# Patient Record
Sex: FEMALE | Race: WHITE | NOT HISPANIC OR LATINO | Employment: UNEMPLOYED | URBAN - METROPOLITAN AREA
[De-identification: names, ages, dates, MRNs, and addresses within clinical notes are randomized per-mention and may not be internally consistent; named-entity substitution may affect disease eponyms.]

---

## 2023-12-17 DIAGNOSIS — M17.0 BILATERAL PRIMARY OSTEOARTHRITIS OF KNEE: ICD-10-CM

## 2023-12-17 RX ORDER — MELOXICAM 15 MG/1
15 TABLET ORAL DAILY
Qty: 30 TABLET | Refills: 3 | Status: ON HOLD | OUTPATIENT
Start: 2023-12-17 | End: 2024-05-09 | Stop reason: ALTCHOICE

## 2024-02-14 ENCOUNTER — HOSPITAL ENCOUNTER (OUTPATIENT)
Dept: RADIOLOGY | Facility: CLINIC | Age: 79
Discharge: HOME | End: 2024-02-14
Payer: MEDICARE

## 2024-02-14 ENCOUNTER — OFFICE VISIT (OUTPATIENT)
Dept: ORTHOPEDIC SURGERY | Facility: CLINIC | Age: 79
End: 2024-02-14
Payer: MEDICARE

## 2024-02-14 VITALS — HEIGHT: 62 IN | WEIGHT: 139 LBS | BODY MASS INDEX: 25.58 KG/M2

## 2024-02-14 DIAGNOSIS — M25.561 ACUTE BILATERAL KNEE PAIN: Primary | ICD-10-CM

## 2024-02-14 DIAGNOSIS — M25.562 ACUTE BILATERAL KNEE PAIN: Primary | ICD-10-CM

## 2024-02-14 DIAGNOSIS — M25.561 ACUTE BILATERAL KNEE PAIN: ICD-10-CM

## 2024-02-14 DIAGNOSIS — M25.562 ACUTE BILATERAL KNEE PAIN: ICD-10-CM

## 2024-02-14 PROCEDURE — 1159F MED LIST DOCD IN RCRD: CPT | Performed by: ORTHOPAEDIC SURGERY

## 2024-02-14 PROCEDURE — 99215 OFFICE O/P EST HI 40 MIN: CPT | Performed by: ORTHOPAEDIC SURGERY

## 2024-02-14 PROCEDURE — 1160F RVW MEDS BY RX/DR IN RCRD: CPT | Performed by: ORTHOPAEDIC SURGERY

## 2024-02-14 PROCEDURE — 1036F TOBACCO NON-USER: CPT | Performed by: ORTHOPAEDIC SURGERY

## 2024-02-14 PROCEDURE — 73564 X-RAY EXAM KNEE 4 OR MORE: CPT | Mod: 50

## 2024-02-14 RX ORDER — ACETAMINOPHEN 325 MG/1
975 TABLET ORAL ONCE
Status: CANCELLED | OUTPATIENT
Start: 2024-02-14 | End: 2024-02-14

## 2024-02-14 RX ORDER — SODIUM CHLORIDE 9 MG/ML
100 INJECTION, SOLUTION INTRAVENOUS CONTINUOUS
Status: CANCELLED | OUTPATIENT
Start: 2024-05-09

## 2024-02-14 RX ORDER — CELECOXIB 400 MG/1
400 CAPSULE ORAL ONCE
Status: CANCELLED | OUTPATIENT
Start: 2024-02-14 | End: 2024-02-14

## 2024-02-14 RX ORDER — CEFAZOLIN SODIUM 2 G/100ML
2 INJECTION, SOLUTION INTRAVENOUS EVERY 8 HOURS
Status: CANCELLED | OUTPATIENT
Start: 2024-05-09

## 2024-02-14 ASSESSMENT — PAIN - FUNCTIONAL ASSESSMENT: PAIN_FUNCTIONAL_ASSESSMENT: NO/DENIES PAIN

## 2024-02-14 NOTE — PROGRESS NOTES
This is a consultation from Dr. Jo Mota MD for   Chief Complaint   Patient presents with    Left Knee - Pain    Right Knee - Pain       This is a 78 y.o. female who presents for evaluation of bilateral knee pain right worse than left.  Patient states she has had knee pain for about 3 to 4 years, its gradually getting worse for the last few weeks she has had severe exacerbation of her right knee pain.  She complains of sharp pain over the anterior medial knee, makes it very difficult for her to walk.  She has had an extensive trial of nonsurgical management occluding use of anti-inflammatories physical therapy activity modification use of assistive devices cortisone injections gel injections.  Despite that she has severe and worsening pain which impacts her quality of life and activities of daily living.  She is never had surgery on either knee    Physical Exam    There has been no interval change in this patient's past medical, surgical, medications, allergies, family history or social history since the most recent visit to a provider within our department. 14 point review of systems was performed, reviewed, and negative except for pertinent positives documented in the history of present illness.     Constitutional: well developed, well nourished female in no acute distress  Psychiatric: normal mood, appropriate affect  Eyes: sclera anicteric  HENT: normocephalic/atraumatic  CV: regular rate and rhythm   Respiratory: non labored breathing  Integumentary: no rash  Neurological: moves all extremities    Right knee exam: skin intact no lacerations or abrations.  1+ effusion.  Tender medial joint line. negative log roll negative patellar grind. ROM 5-100 stable to varus and valgus stress at 0 and 30 degrees. negative lachman negative posterior drawer negative katerin. 5/5 ehl/fhl/gs/ta. silt s/s/sp/dp/t. 2+ dp/pt        Xrays were ordered by me, they were reviewed and independently interpreted by me today, they  show severe degenerative disease bone-on-bone arthritis subchondral sclerosis osteophyte formation Kellgren-Ta grade 4    Procedures      Impression/Plan: This is a 78 y.o. female with severe right knee arthritis that has failed nonoperative management.  Patient elected to proceed with right total knee.    I had an extensive discussion with the patient regarding her condition and possible treatment options. Nonsurgical treatment for right knee arthritis includes activity modification, weight loss, use of a cane or other assistive device, pain medications and nonsteroidal anti-inflammatory medications, joint injections, and physical therapy. The patient has attempted non-surgical treatment for this condition for greater than 6 months and it has failed. We discussed the risks benefits and alternatives of total knee arthroplasty. Benefits of joint replacement include: Relief of pain, improvement of function, and improved quality of life. Alternatives include observation and watchful waiting, and the nonsurgical modalities noted above.     Discussed with the patient that during total knee arthroplasty prosthetic resurfacing of the patella may or may not be performed at the discretion of thesurgeon during surgery. In the event that prosthetic patellar resurfacing is not performed, nonprosthetic arthroplasty will be performed with removal of bone spurs, circumferential synovectomy and electrocautery. Patient was informed that anterior knee pain and patellofemoral crepitus can potentially occur after knee surgery with or without prosthetic patellar resurfacing.    We discussed the risks of complications as well as the risks of morbidity and mortality related to surgical treatment with total joint replacement. We reviewed the fact that total joint replacement is major elective surgery with significant associated surgical and procedural risk factors as detailed below.   Risks include: Pain, blood loss, damage to nearby  "anatomical structures including but not limited to nerves or blood vessels muscles tendons and bone, failure surgery to ameliorate symptoms, persistence of pain surrounding the affected joint, mechanical failure of the prosthesis including but not limited to loosening of the prosthesis from bone ,breakage of the prosthesis and dislocation of the prosthesis, change in length or appearance of a limb, infection possibly necessitating further surgery, removal of the prosthesis, or limb amputation, the need for additional surgery for other reasons, blood clots, amputation, and death. No guarantees were implied or given.  All questions were answered and the patient voiced their understanding.     A complete set of surgical instructions was given to the patient. The patient was educated regarding preoperative nutrition, preparation of their home for postoperative rehabilitation, choosing a care partner, medical and dental clearance, the cessation of medications that can cause bleeding or presenting to risk for infection, chlorhexidine bath, nasal swab and decontamination, post operative follow up, and need for medical equipment. Patient was also educated regarding the possibility of same day surgery and related criteria and protocols. The patient will attend our joint class further education, and thereafter they will return for a preoperative visit. A presurgery education booklet was also given to the patient.     surgical plan: Right total knee  implants: DePuy  approach: Standard  DVT ppx aspirin  drugs to stop none  allergy to abx none  post operative abx: ancef +/- vanc (per protocol)  special clearance needed none    BMI Readings from Last 1 Encounters:   05/15/23 30.13 kg/m²      No results found for: \"CREATININE\"  Tobacco Use: Not on file      Computed MELD 3.0 unavailable. Necessary lab results were not found in the last year.  Computed MELD-Na unavailable. Necessary lab results were not found in the last year.     " "  No results found for: \"HGBA1C\"  No results found for: \"STAPHMRSASCR\"  "

## 2024-02-14 NOTE — LETTER
February 14, 2024     Jo Mota MD    Patient: Domenica Estrada   YOB: 1945   Date of Visit: 2/14/2024       Dear Dr. Jo Mota MD:    Thank you for referring Domenica Estrada to me for evaluation. Below are my notes for this consultation.  If you have questions, please do not hesitate to call me. I look forward to following your patient along with you.       Sincerely,     Sammy Wise MD      CC: No Recipients  ______________________________________________________________________________________    This is a consultation from Dr. Jo Mota MD for   Chief Complaint   Patient presents with   • Left Knee - Pain   • Right Knee - Pain       This is a 78 y.o. female who presents for evaluation of bilateral knee pain right worse than left.  Patient states she has had knee pain for about 3 to 4 years, its gradually getting worse for the last few weeks she has had severe exacerbation of her right knee pain.  She complains of sharp pain over the anterior medial knee, makes it very difficult for her to walk.  She has had an extensive trial of nonsurgical management occluding use of anti-inflammatories physical therapy activity modification use of assistive devices cortisone injections gel injections.  Despite that she has severe and worsening pain which impacts her quality of life and activities of daily living.  She is never had surgery on either knee    Physical Exam    There has been no interval change in this patient's past medical, surgical, medications, allergies, family history or social history since the most recent visit to a provider within our department. 14 point review of systems was performed, reviewed, and negative except for pertinent positives documented in the history of present illness.     Constitutional: well developed, well nourished female in no acute distress  Psychiatric: normal mood, appropriate affect  Eyes: sclera anicteric  HENT: normocephalic/atraumatic  CV:  regular rate and rhythm   Respiratory: non labored breathing  Integumentary: no rash  Neurological: moves all extremities    Right knee exam: skin intact no lacerations or abrations.  1+ effusion.  Tender medial joint line. negative log roll negative patellar grind. ROM 5-100 stable to varus and valgus stress at 0 and 30 degrees. negative lachman negative posterior drawer negative katerin. 5/5 ehl/fhl/gs/ta. silt s/s/sp/dp/t. 2+ dp/pt        Xrays were ordered by me, they were reviewed and independently interpreted by me today, they show severe degenerative disease bone-on-bone arthritis subchondral sclerosis osteophyte formation Kellgren-Ta grade 4    Procedures      Impression/Plan: This is a 78 y.o. female with severe right knee arthritis that has failed nonoperative management.  Patient elected to proceed with right total knee.    I had an extensive discussion with the patient regarding her condition and possible treatment options. Nonsurgical treatment for right knee arthritis includes activity modification, weight loss, use of a cane or other assistive device, pain medications and nonsteroidal anti-inflammatory medications, joint injections, and physical therapy. The patient has attempted non-surgical treatment for this condition for greater than 6 months and it has failed. We discussed the risks benefits and alternatives of total knee arthroplasty. Benefits of joint replacement include: Relief of pain, improvement of function, and improved quality of life. Alternatives include observation and watchful waiting, and the nonsurgical modalities noted above.     Discussed with the patient that during total knee arthroplasty prosthetic resurfacing of the patella may or may not be performed at the discretion of thesurgeon during surgery. In the event that prosthetic patellar resurfacing is not performed, nonprosthetic arthroplasty will be performed with removal of bone spurs, circumferential synovectomy and  electrocautery. Patient was informed that anterior knee pain and patellofemoral crepitus can potentially occur after knee surgery with or without prosthetic patellar resurfacing.    We discussed the risks of complications as well as the risks of morbidity and mortality related to surgical treatment with total joint replacement. We reviewed the fact that total joint replacement is major elective surgery with significant associated surgical and procedural risk factors as detailed below.   Risks include: Pain, blood loss, damage to nearby anatomical structures including but not limited to nerves or blood vessels muscles tendons and bone, failure surgery to ameliorate symptoms, persistence of pain surrounding the affected joint, mechanical failure of the prosthesis including but not limited to loosening of the prosthesis from bone ,breakage of the prosthesis and dislocation of the prosthesis, change in length or appearance of a limb, infection possibly necessitating further surgery, removal of the prosthesis, or limb amputation, the need for additional surgery for other reasons, blood clots, amputation, and death. No guarantees were implied or given.  All questions were answered and the patient voiced their understanding.     A complete set of surgical instructions was given to the patient. The patient was educated regarding preoperative nutrition, preparation of their home for postoperative rehabilitation, choosing a care partner, medical and dental clearance, the cessation of medications that can cause bleeding or presenting to risk for infection, chlorhexidine bath, nasal swab and decontamination, post operative follow up, and need for medical equipment. Patient was also educated regarding the possibility of same day surgery and related criteria and protocols. The patient will attend our joint class further education, and thereafter they will return for a preoperative visit. A presurgery education booklet was also  "given to the patient.     surgical plan: Right total knee  implants: DePuy  approach: Standard  DVT ppx aspirin  drugs to stop none  allergy to abx none  post operative abx: ancef +/- vanc (per protocol)  special clearance needed none    BMI Readings from Last 1 Encounters:   05/15/23 30.13 kg/m²      No results found for: \"CREATININE\"  Tobacco Use: Not on file      Computed MELD 3.0 unavailable. Necessary lab results were not found in the last year.  Computed MELD-Na unavailable. Necessary lab results were not found in the last year.       No results found for: \"HGBA1C\"  No results found for: \"STAPHMRSASCR\"  "

## 2024-02-28 ENCOUNTER — APPOINTMENT (OUTPATIENT)
Dept: ORTHOPEDIC SURGERY | Facility: CLINIC | Age: 79
End: 2024-02-28
Payer: MEDICARE

## 2024-03-04 ENCOUNTER — TELEPHONE (OUTPATIENT)
Dept: PRIMARY CARE | Facility: CLINIC | Age: 79
End: 2024-03-04
Payer: MEDICARE

## 2024-03-04 NOTE — TELEPHONE ENCOUNTER
Pt calls, she states for the last couple of weeks she has been having time swallowing, she gets a pain below the breasts, she has been taking meloxicam once a day for her knees, do you want her to be seen with you of refer to GI

## 2024-03-11 DIAGNOSIS — F41.9 ANXIETY: Primary | ICD-10-CM

## 2024-03-11 RX ORDER — HYDROXYZINE HYDROCHLORIDE 25 MG/1
TABLET, FILM COATED ORAL
Qty: 90 TABLET | Refills: 1 | Status: SHIPPED | OUTPATIENT
Start: 2024-03-11

## 2024-04-18 ENCOUNTER — EDUCATION (OUTPATIENT)
Dept: ORTHOPEDIC SURGERY | Facility: CLINIC | Age: 79
End: 2024-04-18
Payer: COMMERCIAL

## 2024-04-18 NOTE — GROUP NOTE
In addition to the group class activities, Domenica Estrada had the following lab work completed:  No orders of the defined types were placed in this encounter.      A new History and Physical was not completed.    This class lasted approximately 1 hour and had 5 participants. The nurse instructor covered the following topics:  Overview of joint replacement surgery.  Instructions for at-home preparation for surgery (included below).  Expectations before and after surgery including hospital stay.  Discharge planning and home care options.  Physical therapy overview and review of at-home exercises.    Information for Surgery or Hospital Stay  For morning surgery, do not eat or drink after midnight. This includes water, mints, and chewing gum.  For afternoon surgery, you may have a clear liquid breakfast before 6 A.M. Clear liquids are anything you can see through, like apple juice, cranberry juice, tea or black coffee without cream. Do not eat or drink after 6 A.M. This includes water.  Wear loose fitting clothes--something that can be slipped on and off easily over a dressing.  Bring a walker or crutches with you to the hospital on the day of surgery.  Please inform the office if you have any symptoms of illness or fever prior to surgery.  You need to have transportation home when discharged, as you will be medicated.  STOP any aspirin or anti-inflammatory products (Aleve, Advil, etc.) 5-7 days before surgery.  You may use Tylenol or Extra Strength Tylenol.  STOP any vitamins or herbal products 10 days before surgery.

## 2024-04-24 ENCOUNTER — PRE-ADMISSION TESTING (OUTPATIENT)
Dept: PREADMISSION TESTING | Facility: HOSPITAL | Age: 79
End: 2024-04-24
Payer: MEDICARE

## 2024-04-24 VITALS
DIASTOLIC BLOOD PRESSURE: 86 MMHG | OXYGEN SATURATION: 100 % | BODY MASS INDEX: 24.92 KG/M2 | HEIGHT: 61 IN | RESPIRATION RATE: 16 BRPM | WEIGHT: 132 LBS | SYSTOLIC BLOOD PRESSURE: 136 MMHG | TEMPERATURE: 97.7 F | HEART RATE: 98 BPM

## 2024-04-24 DIAGNOSIS — Z01.818 PREOPERATIVE TESTING: Primary | ICD-10-CM

## 2024-04-24 LAB
ANION GAP SERPL CALC-SCNC: 10 MMOL/L
BUN SERPL-MCNC: 23 MG/DL (ref 8–25)
CALCIUM SERPL-MCNC: 9.5 MG/DL (ref 8.5–10.4)
CHLORIDE SERPL-SCNC: 100 MMOL/L (ref 97–107)
CO2 SERPL-SCNC: 27 MMOL/L (ref 24–31)
CREAT SERPL-MCNC: 1.2 MG/DL (ref 0.4–1.6)
EGFRCR SERPLBLD CKD-EPI 2021: 46 ML/MIN/1.73M*2
ERYTHROCYTE [DISTWIDTH] IN BLOOD BY AUTOMATED COUNT: 13.6 % (ref 11.5–14.5)
GLUCOSE SERPL-MCNC: 93 MG/DL (ref 65–99)
HCT VFR BLD AUTO: 45.6 % (ref 36–46)
HGB BLD-MCNC: 14.6 G/DL (ref 12–16)
MCH RBC QN AUTO: 27.6 PG (ref 26–34)
MCHC RBC AUTO-ENTMCNC: 32 G/DL (ref 32–36)
MCV RBC AUTO: 86 FL (ref 80–100)
NRBC BLD-RTO: 0 /100 WBCS (ref 0–0)
PLATELET # BLD AUTO: 202 X10*3/UL (ref 150–450)
POTASSIUM SERPL-SCNC: 4.5 MMOL/L (ref 3.4–5.1)
RBC # BLD AUTO: 5.29 X10*6/UL (ref 4–5.2)
SODIUM SERPL-SCNC: 137 MMOL/L (ref 133–145)
WBC # BLD AUTO: 6.8 X10*3/UL (ref 4.4–11.3)

## 2024-04-24 PROCEDURE — 85027 COMPLETE CBC AUTOMATED: CPT | Performed by: NURSE PRACTITIONER

## 2024-04-24 PROCEDURE — 93005 ELECTROCARDIOGRAM TRACING: CPT

## 2024-04-24 PROCEDURE — 80048 BASIC METABOLIC PNL TOTAL CA: CPT | Performed by: NURSE PRACTITIONER

## 2024-04-24 PROCEDURE — 36415 COLL VENOUS BLD VENIPUNCTURE: CPT

## 2024-04-24 PROCEDURE — 87081 CULTURE SCREEN ONLY: CPT | Mod: TRILAB,WESLAB | Performed by: NURSE PRACTITIONER

## 2024-04-24 PROCEDURE — 99204 OFFICE O/P NEW MOD 45 MIN: CPT | Performed by: NURSE PRACTITIONER

## 2024-04-24 PROCEDURE — 93010 ELECTROCARDIOGRAM REPORT: CPT | Performed by: INTERNAL MEDICINE

## 2024-04-24 RX ORDER — CHLORHEXIDINE GLUCONATE ORAL RINSE 1.2 MG/ML
SOLUTION DENTAL
Qty: 473 ML | Refills: 0 | Status: SHIPPED | OUTPATIENT
Start: 2024-04-24 | End: 2024-05-10 | Stop reason: HOSPADM

## 2024-04-24 RX ORDER — IBUPROFEN 200 MG
400 TABLET ORAL EVERY 6 HOURS PRN
Status: ON HOLD | COMMUNITY
End: 2024-05-09 | Stop reason: ALTCHOICE

## 2024-04-24 ASSESSMENT — DUKE ACTIVITY SCORE INDEX (DASI)
CAN YOU CLIMB A FLIGHT OF STAIRS OR WALK UP A HILL: YES
CAN YOU HAVE SEXUAL RELATIONS: NO
CAN YOU PARTICIPATE IN STRENOUS SPORTS LIKE SWIMMING, SINGLES TENNIS, FOOTBALL, BASKETBALL, OR SKIING: NO
CAN YOU DO LIGHT WORK AROUND THE HOUSE LIKE DUSTING OR WASHING DISHES: YES
CAN YOU DO HEAVY WORK AROUND THE HOUSE LIKE SCRUBBING FLOORS OR LIFTING AND MOVING HEAVY FURNITURE: NO
CAN YOU WALK A BLOCK OR TWO ON LEVEL GROUND: NO
CAN YOU PARTICIPATE IN MODERATE RECREATIONAL ACTIVITIES LIKE GOLF, BOWLING, DANCING, DOUBLES TENNIS OR THROWING A BASEBALL OR FOOTBALL: NO
CAN YOU RUN A SHORT DISTANCE: NO
TOTAL_SCORE: 12.7
CAN YOU DO YARD WORK LIKE RAKING LEAVES, WEEDING OR PUSHING A MOWER: NO
CAN YOU WALK INDOORS, SUCH AS AROUND YOUR HOUSE: YES
CAN YOU TAKE CARE OF YOURSELF (EAT, DRESS, BATHE, OR USE TOILET): YES
DASI METS SCORE: 4.3
CAN YOU DO MODERATE WORK AROUND THE HOUSE LIKE VACUUMING, SWEEPING FLOORS OR CARRYING GROCERIES: NO

## 2024-04-24 ASSESSMENT — ENCOUNTER SYMPTOMS
EYES NEGATIVE: 1
CARDIOVASCULAR NEGATIVE: 1
RESPIRATORY NEGATIVE: 1
GASTROINTESTINAL NEGATIVE: 1
TREMORS: 1
PSYCHIATRIC NEGATIVE: 1
ARTHRALGIAS: 1
ACTIVITY CHANGE: 1

## 2024-04-24 NOTE — PREPROCEDURE INSTRUCTIONS
Why must I stop eating and drinking near surgery time?  With sedation, food or liquid in your stomach can enter your lungs causing serious complications  Increases nausea and vomiting    When do I need to stop eating and drinking before my surgery?   Do not eat or drink after midnight the night before your surgery/procedure.  You may have small sips of water to take your medication.    PAT DISCHARGE INSTRUCTIONS    Please call the Same Day Surgery (SDS) Department of the hospital where your procedure will be performed after 2:00 PM the day before your surgery. If you are scheduled on a Monday, or a Tuesday following a Monday holiday, you will need to call on the last business day prior to your surgery.    [place] [id number] [place] [id number] [phone]    Please let your surgeon know if:      You develop any open sores, shingles, burning or painful urination as these may increase your risk of an infection.   You no longer wish to have the surgery.   Any other personal circumstances change that may lead to the need to cancel or defer this surgery-such as being sick or getting admitted to any hospital within one week of your planned procedure.    Your contact details change, such as a change of address or phone number.    Starting now:     Please DO NOT drink alcohol or smoke for 24 hours before surgery. It is well known that quitting smoking can make a huge difference to your health and recovery from surgery. The longer you abstain from smoking, the better your chances of a healthy recovery. If you need help with quitting, call 8-093-QUIT-NOW to be connected to a trained counselor who will discuss the best methods to help you quit.     Before your surgery:    Please stop all supplements 7 days prior to surgery. Or as directed by your surgeon.   Please stop taking NSAID pain medicine such as Advil and Motrin 7 days before surgery.    If you develop any fever,  cough, cold, rashes, cuts, scratches, scrapes, urinary symptoms or infection anywhere on your body (including teeth and gums) prior to surgery, please call your surgeon’s office as soon as possible. This may require treatment to reduce the chance of cancellation on the day of surgery.    The day before your surgery:   DIET- Please follow the diet instructions at the top of your packet.   Get a good night’s rest.  Use the special soap for bathing if you have been instructed to use one.    Scheduled surgery times may change and you will be notified if this occurs - please check your personal voicemail for any updates.     On the morning of surgery:   Wear comfortable, loose fitting clothes which open in the front. Please do not wear moisturizers, creams, lotions, makeup or perfume.    Please bring with you to surgery:   Photo ID and insurance card   Current list of medicines and allergies   Pacemaker/ Defibrillator/Heart stent cards   CPAP machine and mask    Slings/ splints/ crutches   A copy of your complete advanced directive/DHPOA.    Please do NOT bring with you to surgery:   All jewelry and valuables should be left at home.   Prosthetic devices such as contact lenses, hearing aids, dentures, eyelash extensions, hairpins and body piercings must be removed prior to going in to the surgical suite.    After outpatient surgery:   A responsible adult MUST accompany you at the time of discharge and stay with you for 24 hours after your surgery. You may NOT drive yourself home after surgery.    Do not drive, operate machinery, make critical decisions or do activities that require co-ordination or balance until after a night’s sleep.   Do not drink alcoholic beverages for 24 hours.   Instructions for resuming your medications will be provided by your surgeon.    CALL YOUR DOCTOR AFTER SURGERY IF YOU HAVE:     Chills and/or a fever of 101° F or higher.    Redness, swelling, pus or drainage from your surgical wound or a  bad smell from the wound.    Lightheadedness, fainting or confusion.    Persistent vomiting (throwing up) and are not able to eat or drink for 12 hours.    Three or more loose, watery bowel movements in 24 hours (diarrhea).   Difficulty or pain while urinating( after non-urological surgery)    Pain and swelling in your legs, especially if it is only on one side.    Difficulty breathing or are breathing faster than normal.    Any new concerning symptoms.        Patient Information: Pre-Operative Infection Prevention Measures     Why did I have my nose, under my arms, and groin swabbed?  The purpose of the swab is to identify Staphylococcus aureus inside your nose or on your skin.  The swab was sent to the laboratory for culture.  A positive swab/culture for Staphylococcus aureus is called colonization or carriage.      What is Staphylococcus aureus?  Staphylococcus aureus, also known as “staph”, is a germ found on the skin or in the nose of healthy people.  Sometimes Staphylococcus aureus can get into the body and cause an infection.  This can be minor (such as pimples, boils, or other skin problems).  It might also be serious (such as a blood infection, pneumonia, or a surgical site infection).    What is Staphylococcus aureus colonization or carriage?  Colonization or carriage means that a person has the germ but is not sick from it.  These bacteria can be spread on the hands or when breathing or sneezing.    How is Staphylococcus aureus spread?  It is most often spread by close contact with a person or item that carries it.    What happens if my culture is positive for Staphylococcus aureus?  Your doctor/medical team will use this information to guide any antibiotic treatment which may be necessary.  Regardless of the culture results, we will clean the inside of your nose with a betadine swab just before you have your surgery.      Will I get an infection if I have Staphylococcus aureus in my nose or on my  skin?  Anyone can get an infection with Staphylococcus aureus.  However, the best way to reduce your risk of infection is to follow the instructions provided to you for the use of your CHG soap and dental rinse.        Patient Information: Oral/Dental Rinse    What is oral/dental rinse?   It is a mouthwash. It is a way of cleaning the mouth with a germ-killing solution before your surgery.  The solution contains chlorhexidine, commonly known as CHG.   It is used inside the mouth to kill a bacteria known as Staphylococcus aureus.  Let your doctor know if you are allergic to Chlorhexidine.    Why do I need to use CHG oral/dental rinse?  The CHG oral/dental rinse helps to kill a bacteria in your mouth known as Staphylococcus aureus.     This reduces the risk of infection at the surgical site.      Using your CHG oral/dental rinse  STEPS:  Use your CHG oral/dental rinse after you brush your teeth the night before (at bedtime) and the morning of your surgery.  Follow all directions on your prescription label.    Use the cap on the container to measure 15ml   Swish (gargle if you can) the mouthwash in your mouth for at least 30 seconds, (do not swallow) and spit out  After you use your CHG rinse, do not rinse your mouth with water, drink or eat.  Please refer to the prescription label for the appropriate time to resume oral intake      What side effects might I have using the CHG oral/dental rinse?  CHG rinse will stick to plaque on the teeth.  Brush and floss just before use.  Teeth brushing will help avoid staining of plaque during use.      Patient Information: Home Preoperative Antibacterial Shower      What is a home preoperative antibacterial shower?  This shower is a way of cleaning the skin with a germ-killing solution before surgery.  The solution contains chlorhexidine, commonly known as CHG.  CHG is a skin cleanser with germ-killing ability.  Let your doctor know if you are allergic to chlorhexidine.    Why do  I need to take a preoperative antibacterial shower?  Skin is not sterile.  It is best to try to make your skin as free of germs as possible before surgery.  Proper cleansing with a germ-killing soap before surgery can lower the number of germs on your skin.  This helps to reduce the risk of infection at the surgical site.  Following the instructions listed below will help you prepare your skin for surgery.      How do I use the solution?  Steps:  Begin using your CHG soap 5 days before your scheduled surgery on ________________________.    First, wash and rinse your hair using the CHG soap. Keep CHG soap away from ear canals and eyes.  Rinse completely, do not condition.  Hair extensions should be removed.  Wash your face with your normal soap and rinse.    Apply the CHG solution to a clean wet washcloth.  Turn the water off or move away from the water spray to avoid premature rinsing of the CHG soap as you are applying.   Firmly lather your entire body from the neck down.  Do not use on your face.  Pay special attention to the area(s) where your incision(s) will be located unless they are on your face.  Avoid scrubbing your skin too hard.  The important point is to have the CHG soap sit on your skin for 3 minutes.    When the 3 minutes are up, turn on the water and rinse the CHG solution off your body completely.   DO NOT wash with regular soap after you have used the CHG soap solution  Pat yourself dry with a clean, freshly-laundered towel.  DO NOT apply powders, deodorants, or lotions.  Dress in clean, freshly laundered nightclothes.    Be sure to sleep with clean, freshly laundered sheets.  Be aware that CHG will cause stains on fabrics; if you wash them with bleach after use.  Rinse your washcloth and other linens that have contact with CHG completely.  Use only non-chlorine detergents to launder the items used.   The morning of surgery is the fifth day.  Repeat the above steps and dress in clean comfortable  clothing     Whom should I contact if I have any questions regarding the use of CHG soap?  Call the [facility] at [phone] if you have any questions.               Preoperative Brain Exercises    What are brain exercises?  A brain exercise is any activity that engages your thinking (cognitive) skills.    What types of activities are considered brain exercises?  Jigsaw puzzles, crossword puzzles, word jumble, memory games, word search, and many more.  Many can be found free online or on your phone via a mobile krish.    Why should I do brain exercises before my surgery?  More recent research has shown brain exercise before surgery can lower the risk of postoperative delirium (confusion) which can be especially important for older adults.  Patients who did brain exercises for 5 to 10 hours the days before surgery, cut their risk of postoperative delirium in half up to 1 week after surgery.     Medication List            Accurate as of April 24, 2024 10:39 AM. Always use your most recent med list.                hydrOXYzine HCL 25 mg tablet  Commonly known as: Atarax  1 TABLET AS NEEDED DAILY FOR ANXIETY  Notes to patient: Hold dose day of surgery.     ibuprofen 200 mg tablet  Medication Adjustments for Surgery: Stop 7 days before surgery     MAG GLYCINATE ORAL  Medication Adjustments for Surgery: Stop 7 days before surgery     meloxicam 15 mg tablet  Commonly known as: Mobic  TAKE 1 TABLET BY MOUTH EVERY DAY WITH FOOD  Notes to patient: NOT TAKING     NON FORMULARY  Medication Adjustments for Surgery: Stop 7 days before surgery  Notes to patient: STOP ALL SUPPLEMENTS ONE WEEK BEFORE

## 2024-04-24 NOTE — CPM/PAT H&P
CPM/PAT Evaluation       Name: Domenica Estrada (Domenica Estrada)  /Age: 1945/78 y.o.     In-Person       Chief Complaint: Bilateral knee pain     HPI  An 78-year-old female with bilateral knee pain. History of progressive bilateral knee pain, R knee for years and L knee more recently. She has had progressive right knee pain that has become more severe in the past 5 years. Symptoms increase with prolonged standing, ambulation, stairs, or transitioning from sitting to standing interfering with ADLs and quality of life.  Conservative treatments/injections not helping.  Denies fever, chills, chest pain, shortness of breath, syncope, or right lower extremity numbness.  She is scheduled for right total knee replacement, unilateral cement.    Past Medical History:   Diagnosis Date    Anxiety     GERD (gastroesophageal reflux disease)     HLD (hyperlipidemia)     Osteoarthritis     Osteopenia        Past Surgical History:   Procedure Laterality Date    APPENDECTOMY      CHOLECYSTECTOMY           Allergies   Allergen Reactions    Mobic [Meloxicam] GI Upset     GASTRITIS       Current Outpatient Medications   Medication Sig Dispense Refill    chlorhexidine (Peridex) 0.12 % solution Use cap to measure 15 mL.  Swish/gargle mouthwash for at least 30 seconds.  Do not swallow.  Use night before surgery after brushing teeth and morning of surgery after brushing teeth. 473 mL 0    hydrOXYzine HCL (Atarax) 25 mg tablet 1 TABLET AS NEEDED DAILY FOR ANXIETY 90 tablet 1    ibuprofen 200 mg tablet Take 2 tablets (400 mg) by mouth every 6 hours if needed for mild pain (1 - 3).      magnesium glycinate (MAG GLYCINATE ORAL) Take 1 tablet by mouth once daily.      meloxicam (Mobic) 15 mg tablet TAKE 1 TABLET BY MOUTH EVERY DAY WITH FOOD (Patient not taking: Reported on 2024) 30 tablet 3    NON FORMULARY Take 1 each by mouth once daily. HEMP OIL COMPLEX DAILY  OKRA PEPSIN E3 DAILY  PROSYNBIOTIC DAILY  ZYMEX DAILY       No  "current facility-administered medications for this visit.       Review of Systems   Constitutional:  Positive for activity change.   HENT: Negative.     Eyes: Negative.         Glasses   Respiratory: Negative.     Cardiovascular: Negative.    Gastrointestinal: Negative.    Musculoskeletal:  Positive for arthralgias and gait problem.        Bilateral knee pain, uses a cane   Skin: Negative.    Neurological:  Positive for tremors.   Psychiatric/Behavioral: Negative.          Physical Exam  Vitals reviewed.   HENT:      Head: Normocephalic.      Mouth/Throat:      Mouth: Mucous membranes are moist.   Eyes:      Pupils: Pupils are equal, round, and reactive to light.   Cardiovascular:      Rate and Rhythm: Normal rate and regular rhythm.   Pulmonary:      Effort: Pulmonary effort is normal.      Breath sounds: Normal breath sounds.   Abdominal:      Palpations: Abdomen is soft.   Musculoskeletal:      Cervical back: Normal range of motion.      Right lower leg: Edema present.      Left lower leg: Edema present.   Skin:     General: Skin is warm and dry.   Neurological:      Mental Status: She is alert and oriented to person, place, and time.      Comments: Head tremor present   Psychiatric:         Mood and Affect: Mood normal.          PAT AIRWAY:   Airway:     Mallampati::  II    Neck ROM::  Full  normal        /86   Pulse 98   Temp 36.5 °C (97.7 °F) (Temporal)   Resp 16   Ht 1.549 m (5' 1\")   Wt 59.9 kg (132 lb)   SpO2 100%   BMI 24.94 kg/m²        Stop Bang Score 1     CHADS 2 score: 2.8%  DASI score: 12.7  METS score: 4.3  Revised cardiac risk index: 0.9%  ASA II  ARISCAT 1.6%  PAT order CBC, BMP, UA, MRSA, EKG  EKG at PAT Normal sinus rhythm, anterior infarct age undetermined limb and area awaiting confirmation by cardiologist  Assessment and Plan:     Acute bilateral knee pain Plan: Total knee replacement, unilateral cement  Anxiety  Osteopenia  Osteoarthritis  H/o HLD-no longer uses " medication  BMI-24.94

## 2024-04-25 ENCOUNTER — APPOINTMENT (OUTPATIENT)
Dept: PREADMISSION TESTING | Facility: HOSPITAL | Age: 79
End: 2024-04-25
Payer: MEDICARE

## 2024-04-25 LAB
ATRIAL RATE: 75 BPM
P AXIS: 24 DEGREES
P OFFSET: 188 MS
P ONSET: 147 MS
PR INTERVAL: 152 MS
Q ONSET: 223 MS
QRS COUNT: 12 BEATS
QRS DURATION: 82 MS
QT INTERVAL: 382 MS
QTC CALCULATION(BAZETT): 426 MS
QTC FREDERICIA: 411 MS
R AXIS: 41 DEGREES
T AXIS: 43 DEGREES
T OFFSET: 414 MS
VENTRICULAR RATE: 75 BPM

## 2024-04-26 LAB — STAPHYLOCOCCUS SPEC CULT: NORMAL

## 2024-05-02 ENCOUNTER — HOSPITAL ENCOUNTER (OUTPATIENT)
Dept: RADIOLOGY | Facility: HOSPITAL | Age: 79
Discharge: HOME | End: 2024-05-02
Payer: MEDICARE

## 2024-05-02 ENCOUNTER — OFFICE VISIT (OUTPATIENT)
Dept: ORTHOPEDIC SURGERY | Facility: CLINIC | Age: 79
End: 2024-05-02
Payer: MEDICARE

## 2024-05-02 DIAGNOSIS — M25.562 ACUTE BILATERAL KNEE PAIN: ICD-10-CM

## 2024-05-02 DIAGNOSIS — M25.561 ACUTE BILATERAL KNEE PAIN: Primary | ICD-10-CM

## 2024-05-02 DIAGNOSIS — M25.561 ACUTE BILATERAL KNEE PAIN: ICD-10-CM

## 2024-05-02 DIAGNOSIS — M25.562 ACUTE BILATERAL KNEE PAIN: Primary | ICD-10-CM

## 2024-05-02 LAB
APPEARANCE UR: ABNORMAL
BACTERIA #/AREA URNS AUTO: ABNORMAL /HPF
BILIRUB UR STRIP.AUTO-MCNC: NEGATIVE MG/DL
CAOX CRY #/AREA UR COMP ASSIST: ABNORMAL /HPF
COLOR UR: YELLOW
GLUCOSE UR STRIP.AUTO-MCNC: NORMAL MG/DL
HYALINE CASTS #/AREA URNS AUTO: ABNORMAL /LPF
KETONES UR STRIP.AUTO-MCNC: NEGATIVE MG/DL
LEUKOCYTE ESTERASE UR QL STRIP.AUTO: ABNORMAL
MUCOUS THREADS #/AREA URNS AUTO: ABNORMAL /LPF
NITRITE UR QL STRIP.AUTO: ABNORMAL
PH UR STRIP.AUTO: 6 [PH]
PROT UR STRIP.AUTO-MCNC: ABNORMAL MG/DL
RBC # UR STRIP.AUTO: NEGATIVE /UL
RBC #/AREA URNS AUTO: ABNORMAL /HPF
SP GR UR STRIP.AUTO: 1.02
SQUAMOUS #/AREA URNS AUTO: ABNORMAL /HPF
UROBILINOGEN UR STRIP.AUTO-MCNC: ABNORMAL MG/DL
WBC #/AREA URNS AUTO: >50 /HPF

## 2024-05-02 PROCEDURE — 77073 BONE LENGTH STUDIES: CPT

## 2024-05-02 PROCEDURE — 99213 OFFICE O/P EST LOW 20 MIN: CPT | Performed by: ORTHOPAEDIC SURGERY

## 2024-05-02 PROCEDURE — 1036F TOBACCO NON-USER: CPT | Performed by: ORTHOPAEDIC SURGERY

## 2024-05-02 PROCEDURE — 77073 BONE LENGTH STUDIES: CPT | Performed by: RADIOLOGY

## 2024-05-02 PROCEDURE — 1159F MED LIST DOCD IN RCRD: CPT | Performed by: ORTHOPAEDIC SURGERY

## 2024-05-02 PROCEDURE — 1160F RVW MEDS BY RX/DR IN RCRD: CPT | Performed by: ORTHOPAEDIC SURGERY

## 2024-05-02 PROCEDURE — 87086 URINE CULTURE/COLONY COUNT: CPT | Mod: TRILAB,WESLAB | Performed by: NURSE PRACTITIONER

## 2024-05-02 PROCEDURE — 81001 URINALYSIS AUTO W/SCOPE: CPT | Performed by: NURSE PRACTITIONER

## 2024-05-02 NOTE — PROGRESS NOTES
Chief Complaint   Patient presents with    Right Knee - Follow-up     PRE OP RT TKA          This is a 78 y.o. year old female who presents for preoperative visit for her left knee.  At her previous visit, patient's right knee has been hurting worse than left and she decided on right total knee initially.  She does have severe bilateral knee arthritis.  In the last couple of months she has been needing her left knee has been hurting much more than the right.  She would like to switch into the left knee.  Patient has severe degenerative disease of the affected joint. The patient complains of severe pain in the area, the pain is gradually getting worse. She has failed extensive nonsurgical treatment including anti-inflammatories physical therapy use of assistive devices activity modification cortisone injections. Despite this interventions the patient is worsening pain which impacts her quality of life and activities of daily living and they would like to proceed with joint replacement.    Physical Exam    There has been no interval change in this patient's past medical, surgical, medications, allergies, family history or social history since the most recent visit to a provider within our department. 14 point review of systems was performed, reviewed, and negative except for pertinent positives documented in the history of present illness.     Constitutional: well developed, well nourished female in no acute distress  Psychiatric: normal mood, appropriate affect  Eyes: sclera anicteric  HENT: normocephalic/atraumatic  CV: regular rate and rhythm   Respiratory: non labored breathing  Integumentary: no rash  Neurological: moves all extremities    Pre-Admission Testing on 04/24/2024   Component Date Value Ref Range Status    Staph/MRSA Screen Culture 04/24/2024 No Staphylococcus aureus isolated   Final    WBC 04/24/2024 6.8  4.4 - 11.3 x10*3/uL Final    nRBC 04/24/2024 0.0  0.0 - 0.0 /100 WBCs Final    RBC 04/24/2024  5.29 (H)  4.00 - 5.20 x10*6/uL Final    Hemoglobin 04/24/2024 14.6  12.0 - 16.0 g/dL Final    Hematocrit 04/24/2024 45.6  36.0 - 46.0 % Final    MCV 04/24/2024 86  80 - 100 fL Final    MCH 04/24/2024 27.6  26.0 - 34.0 pg Final    MCHC 04/24/2024 32.0  32.0 - 36.0 g/dL Final    RDW 04/24/2024 13.6  11.5 - 14.5 % Final    Platelets 04/24/2024 202  150 - 450 x10*3/uL Final    Glucose 04/24/2024 93  65 - 99 mg/dL Final    Sodium 04/24/2024 137  133 - 145 mmol/L Final    Potassium 04/24/2024 4.5  3.4 - 5.1 mmol/L Final    Chloride 04/24/2024 100  97 - 107 mmol/L Final    Bicarbonate 04/24/2024 27  24 - 31 mmol/L Final    Urea Nitrogen 04/24/2024 23  8 - 25 mg/dL Final    Creatinine 04/24/2024 1.20  0.40 - 1.60 mg/dL Final    eGFR 04/24/2024 46 (L)  >60 mL/min/1.73m*2 Final    Calculations of estimated GFR are performed using the 2021 CKD-EPI Study Refit equation without the race variable for the IDMS-Traceable creatinine methods.  https://jasn.asnjournals.org/content/early/2021/09/22/ASN.8703322914    Calcium 04/24/2024 9.5  8.5 - 10.4 mg/dL Final    Anion Gap 04/24/2024 10  <=19 mmol/L Final    Ventricular Rate 04/24/2024 75  BPM Final    Atrial Rate 04/24/2024 75  BPM Final    OR Interval 04/24/2024 152  ms Final    QRS Duration 04/24/2024 82  ms Final    QT Interval 04/24/2024 382  ms Final    QTC Calculation(Bazett) 04/24/2024 426  ms Final    P Axis 04/24/2024 24  degrees Final    R Axis 04/24/2024 41  degrees Final    T Axis 04/24/2024 43  degrees Final    QRS Count 04/24/2024 12  beats Final    Q Onset 04/24/2024 223  ms Final    P Onset 04/24/2024 147  ms Final    P Offset 04/24/2024 188  ms Final    T Offset 04/24/2024 414  ms Final    QTC Fredericia 04/24/2024 411  ms Final         Left knee exam: skin intact no lacerations or abrations.  1+ effusion.  Tender medial joint line. negative log roll negative patellar grind. ROM 5-100, slight varus deformity. stable to varus and valgus stress at 0 and 30 degrees.  "negative lachman negative posterior drawer negative katerin. 5/5 ehl/fhl/gs/ta. silt s/s/sp/dp/t. 2+ dp/pt    X-rays left knee were reviewed and interpreted by me, show severe degenerative disease bone-on-bone arthritis subchondral sclerosis osteophyte formation Kellgren-Ta grade 4    Preoperative labs reviewed, no findings which would preclude surgery    Impression plan: This is a 78 y.o. yo femalewith severe end-stage degenerative disease of the left knee that has failed nonoperative management.  Once again I discussed with the patient in detail the risks benefits and alternatives of total joint replacement. For the full details of that discussion see my previous note.  The complete discussion in that note regarding the right knee, this all applies to the left which has similar severe arthritis.  The patient has obtained appropriate medical and dental clearance, and her labs have been reviewed. We will plan to proceed with surgery.    BMI Readings from Last 1 Encounters:   04/24/24 24.94 kg/m²     Lab Results   Component Value Date    CREATININE 1.20 04/24/2024     Tobacco Use: Low Risk  (2/14/2024)    Patient History     Smoking Tobacco Use: Never     Smokeless Tobacco Use: Never     Passive Exposure: Not on file      Computed MELD 3.0 unavailable. One or more values for this score either were not found within the given timeframe or did not fit some other criterion.  Computed MELD-Na unavailable. One or more values for this score either were not found within the given timeframe or did not fit some other criterion.       No results found for: \"HGBA1C\"  Lab Results   Component Value Date    STAPHMRSASCR No Staphylococcus aureus isolated 04/24/2024     "

## 2024-05-05 LAB — BACTERIA UR CULT: ABNORMAL

## 2024-05-06 DIAGNOSIS — Z96.652 S/P TOTAL KNEE ARTHROPLASTY, LEFT: Primary | ICD-10-CM

## 2024-05-06 DIAGNOSIS — N39.0 URINARY TRACT INFECTION WITHOUT HEMATURIA, SITE UNSPECIFIED: ICD-10-CM

## 2024-05-06 RX ORDER — CIPROFLOXACIN 500 MG/1
500 TABLET ORAL 2 TIMES DAILY
Qty: 10 TABLET | Refills: 0 | Status: SHIPPED | OUTPATIENT
Start: 2024-05-06 | End: 2024-05-11

## 2024-05-09 ENCOUNTER — APPOINTMENT (OUTPATIENT)
Dept: RADIOLOGY | Facility: HOSPITAL | Age: 79
End: 2024-05-09
Payer: MEDICARE

## 2024-05-09 ENCOUNTER — ANESTHESIA EVENT (OUTPATIENT)
Dept: OPERATING ROOM | Facility: HOSPITAL | Age: 79
End: 2024-05-09
Payer: MEDICARE

## 2024-05-09 ENCOUNTER — HOSPITAL ENCOUNTER (OUTPATIENT)
Facility: HOSPITAL | Age: 79
Discharge: HOME | End: 2024-05-10
Attending: ORTHOPAEDIC SURGERY | Admitting: ORTHOPAEDIC SURGERY
Payer: MEDICARE

## 2024-05-09 ENCOUNTER — HOME HEALTH ADMISSION (OUTPATIENT)
Dept: HOME HEALTH SERVICES | Facility: HOME HEALTH | Age: 79
End: 2024-05-09
Payer: MEDICARE

## 2024-05-09 ENCOUNTER — ANESTHESIA (OUTPATIENT)
Dept: OPERATING ROOM | Facility: HOSPITAL | Age: 79
End: 2024-05-09
Payer: MEDICARE

## 2024-05-09 DIAGNOSIS — M25.561 ACUTE BILATERAL KNEE PAIN: Primary | ICD-10-CM

## 2024-05-09 DIAGNOSIS — M17.12 ARTHRITIS OF LEFT KNEE: ICD-10-CM

## 2024-05-09 DIAGNOSIS — M25.562 ACUTE BILATERAL KNEE PAIN: Primary | ICD-10-CM

## 2024-05-09 PROBLEM — Z96.652 S/P TOTAL KNEE ARTHROPLASTY, LEFT: Status: ACTIVE | Noted: 2024-05-09

## 2024-05-09 PROCEDURE — 27447 TOTAL KNEE ARTHROPLASTY: CPT

## 2024-05-09 PROCEDURE — 2500000001 HC RX 250 WO HCPCS SELF ADMINISTERED DRUGS (ALT 637 FOR MEDICARE OP)

## 2024-05-09 PROCEDURE — A6213 FOAM DRG >16<=48 SQ IN W/BDR: HCPCS | Performed by: ORTHOPAEDIC SURGERY

## 2024-05-09 PROCEDURE — 7100000001 HC RECOVERY ROOM TIME - INITIAL BASE CHARGE: Performed by: ORTHOPAEDIC SURGERY

## 2024-05-09 PROCEDURE — 3700000001 HC GENERAL ANESTHESIA TIME - INITIAL BASE CHARGE: Performed by: ORTHOPAEDIC SURGERY

## 2024-05-09 PROCEDURE — 2780000003 HC OR 278 NO HCPCS: Performed by: ORTHOPAEDIC SURGERY

## 2024-05-09 PROCEDURE — 0753T DGTZ GLS MCRSCP SLD LEVEL IV: CPT | Mod: TRILAB,WESLAB | Performed by: ORTHOPAEDIC SURGERY

## 2024-05-09 PROCEDURE — 3600000005 HC OR TIME - INITIAL BASE CHARGE - PROCEDURE LEVEL FIVE: Performed by: ORTHOPAEDIC SURGERY

## 2024-05-09 PROCEDURE — 2720000007 HC OR 272 NO HCPCS: Performed by: ORTHOPAEDIC SURGERY

## 2024-05-09 PROCEDURE — 73560 X-RAY EXAM OF KNEE 1 OR 2: CPT | Mod: LT

## 2024-05-09 PROCEDURE — 7100000002 HC RECOVERY ROOM TIME - EACH INCREMENTAL 1 MINUTE: Performed by: ORTHOPAEDIC SURGERY

## 2024-05-09 PROCEDURE — A27447 PR TOTAL KNEE ARTHROPLASTY: Performed by: ANESTHESIOLOGY

## 2024-05-09 PROCEDURE — 2500000004 HC RX 250 GENERAL PHARMACY W/ HCPCS (ALT 636 FOR OP/ED): Performed by: ANESTHESIOLOGIST ASSISTANT

## 2024-05-09 PROCEDURE — C1713 ANCHOR/SCREW BN/BN,TIS/BN: HCPCS | Performed by: ORTHOPAEDIC SURGERY

## 2024-05-09 PROCEDURE — 2500000004 HC RX 250 GENERAL PHARMACY W/ HCPCS (ALT 636 FOR OP/ED)

## 2024-05-09 PROCEDURE — 27447 TOTAL KNEE ARTHROPLASTY: CPT | Performed by: ORTHOPAEDIC SURGERY

## 2024-05-09 PROCEDURE — C1776 JOINT DEVICE (IMPLANTABLE): HCPCS | Performed by: ORTHOPAEDIC SURGERY

## 2024-05-09 PROCEDURE — A4649 SURGICAL SUPPLIES: HCPCS | Performed by: ORTHOPAEDIC SURGERY

## 2024-05-09 PROCEDURE — 2500000005 HC RX 250 GENERAL PHARMACY W/O HCPCS: Performed by: ANESTHESIOLOGIST ASSISTANT

## 2024-05-09 PROCEDURE — 7100000011 HC EXTENDED STAY RECOVERY HOURLY - NURSING UNIT

## 2024-05-09 PROCEDURE — A27447 PR TOTAL KNEE ARTHROPLASTY: Performed by: ANESTHESIOLOGIST ASSISTANT

## 2024-05-09 PROCEDURE — A4216 STERILE WATER/SALINE, 10 ML: HCPCS | Performed by: ORTHOPAEDIC SURGERY

## 2024-05-09 PROCEDURE — 2500000004 HC RX 250 GENERAL PHARMACY W/ HCPCS (ALT 636 FOR OP/ED): Performed by: ANESTHESIOLOGY

## 2024-05-09 PROCEDURE — 64447 NJX AA&/STRD FEMORAL NRV IMG: CPT | Performed by: ANESTHESIOLOGY

## 2024-05-09 PROCEDURE — G0378 HOSPITAL OBSERVATION PER HR: HCPCS

## 2024-05-09 PROCEDURE — 3600000010 HC OR TIME - EACH INCREMENTAL 1 MINUTE - PROCEDURE LEVEL FIVE: Performed by: ORTHOPAEDIC SURGERY

## 2024-05-09 PROCEDURE — 88305 TISSUE EXAM BY PATHOLOGIST: CPT | Performed by: PATHOLOGY

## 2024-05-09 PROCEDURE — 2500000004 HC RX 250 GENERAL PHARMACY W/ HCPCS (ALT 636 FOR OP/ED): Mod: JZ | Performed by: ORTHOPAEDIC SURGERY

## 2024-05-09 PROCEDURE — A9999 DME SUPPLY OR ACCESSORY, NOS: HCPCS | Performed by: ORTHOPAEDIC SURGERY

## 2024-05-09 PROCEDURE — 73560 X-RAY EXAM OF KNEE 1 OR 2: CPT | Mod: LEFT SIDE | Performed by: RADIOLOGY

## 2024-05-09 PROCEDURE — 3700000002 HC GENERAL ANESTHESIA TIME - EACH INCREMENTAL 1 MINUTE: Performed by: ORTHOPAEDIC SURGERY

## 2024-05-09 PROCEDURE — RXMED WILLOW AMBULATORY MEDICATION CHARGE

## 2024-05-09 PROCEDURE — 99100 ANES PT EXTEME AGE<1 YR&>70: CPT | Performed by: ANESTHESIOLOGY

## 2024-05-09 PROCEDURE — 2500000004 HC RX 250 GENERAL PHARMACY W/ HCPCS (ALT 636 FOR OP/ED): Performed by: ORTHOPAEDIC SURGERY

## 2024-05-09 PROCEDURE — 88311 DECALCIFY TISSUE: CPT | Performed by: PATHOLOGY

## 2024-05-09 DEVICE — ATTUNE PATELLA MEDIALIZED DOME 35MM CEMENTED AOX
Type: IMPLANTABLE DEVICE | Site: KNEE | Status: FUNCTIONAL
Brand: ATTUNE

## 2024-05-09 DEVICE — ATTUNE KNEE SYSTEM FEMORAL POSTERIOR STABILIZED SIZE 4 LEFT CEMENTED
Type: IMPLANTABLE DEVICE | Site: KNEE | Status: FUNCTIONAL
Brand: ATTUNE

## 2024-05-09 DEVICE — FULL DOSE BONE CEMENT, 10 PACK CATALOG NUMBER IS 6191-1-010
Type: IMPLANTABLE DEVICE | Site: KNEE | Status: FUNCTIONAL
Brand: SIMPLEX

## 2024-05-09 DEVICE — ATTUNE KNEE SYSTEM TIBIAL INSERT ROTATING PLATFORM POSTERIOR STABILIZED 4 6MM AOX
Type: IMPLANTABLE DEVICE | Site: KNEE | Status: FUNCTIONAL
Brand: ATTUNE

## 2024-05-09 DEVICE — ATTUNE KNEE SYSTEM TIBIAL BASE ROTATING PLATFORM SIZE 4 CEMENTED
Type: IMPLANTABLE DEVICE | Site: KNEE | Status: FUNCTIONAL
Brand: ATTUNE

## 2024-05-09 RX ORDER — FENTANYL CITRATE 50 UG/ML
50 INJECTION, SOLUTION INTRAMUSCULAR; INTRAVENOUS EVERY 5 MIN PRN
Status: DISCONTINUED | OUTPATIENT
Start: 2024-05-09 | End: 2024-05-09 | Stop reason: HOSPADM

## 2024-05-09 RX ORDER — PROPOFOL 10 MG/ML
INJECTION, EMULSION INTRAVENOUS CONTINUOUS PRN
Status: DISCONTINUED | OUTPATIENT
Start: 2024-05-09 | End: 2024-05-09

## 2024-05-09 RX ORDER — SODIUM CHLORIDE, SODIUM LACTATE, POTASSIUM CHLORIDE, CALCIUM CHLORIDE 600; 310; 30; 20 MG/100ML; MG/100ML; MG/100ML; MG/100ML
50 INJECTION, SOLUTION INTRAVENOUS CONTINUOUS
Status: DISCONTINUED | OUTPATIENT
Start: 2024-05-09 | End: 2024-05-10 | Stop reason: HOSPADM

## 2024-05-09 RX ORDER — ASPIRIN 81 MG/1
81 TABLET ORAL 2 TIMES DAILY
Status: DISCONTINUED | OUTPATIENT
Start: 2024-05-09 | End: 2024-05-10 | Stop reason: HOSPADM

## 2024-05-09 RX ORDER — DIPHENHYDRAMINE HCL 12.5MG/5ML
12.5 LIQUID (ML) ORAL EVERY 6 HOURS PRN
Status: DISCONTINUED | OUTPATIENT
Start: 2024-05-09 | End: 2024-05-10 | Stop reason: HOSPADM

## 2024-05-09 RX ORDER — KETOROLAC TROMETHAMINE 30 MG/ML
15 INJECTION, SOLUTION INTRAMUSCULAR; INTRAVENOUS EVERY 6 HOURS
Status: DISCONTINUED | OUTPATIENT
Start: 2024-05-09 | End: 2024-05-10 | Stop reason: HOSPADM

## 2024-05-09 RX ORDER — SODIUM CHLORIDE, SODIUM LACTATE, POTASSIUM CHLORIDE, CALCIUM CHLORIDE 600; 310; 30; 20 MG/100ML; MG/100ML; MG/100ML; MG/100ML
100 INJECTION, SOLUTION INTRAVENOUS CONTINUOUS
Status: DISCONTINUED | OUTPATIENT
Start: 2024-05-09 | End: 2024-05-09 | Stop reason: HOSPADM

## 2024-05-09 RX ORDER — NALOXONE HYDROCHLORIDE 0.4 MG/ML
0.2 INJECTION, SOLUTION INTRAMUSCULAR; INTRAVENOUS; SUBCUTANEOUS EVERY 5 MIN PRN
Status: DISCONTINUED | OUTPATIENT
Start: 2024-05-09 | End: 2024-05-10 | Stop reason: HOSPADM

## 2024-05-09 RX ORDER — ROPIVACAINE HYDROCHLORIDE 5 MG/ML
INJECTION, SOLUTION EPIDURAL; INFILTRATION; PERINEURAL AS NEEDED
Status: DISCONTINUED | OUTPATIENT
Start: 2024-05-09 | End: 2024-05-09 | Stop reason: HOSPADM

## 2024-05-09 RX ORDER — SODIUM CHLORIDE, SODIUM LACTATE, POTASSIUM CHLORIDE, CALCIUM CHLORIDE 600; 310; 30; 20 MG/100ML; MG/100ML; MG/100ML; MG/100ML
INJECTION, SOLUTION INTRAVENOUS CONTINUOUS PRN
Status: DISCONTINUED | OUTPATIENT
Start: 2024-05-09 | End: 2024-05-09

## 2024-05-09 RX ORDER — SODIUM CHLORIDE 9 MG/ML
INJECTION, SOLUTION INTRAMUSCULAR; INTRAVENOUS; SUBCUTANEOUS AS NEEDED
Status: DISCONTINUED | OUTPATIENT
Start: 2024-05-09 | End: 2024-05-09 | Stop reason: HOSPADM

## 2024-05-09 RX ORDER — OXYCODONE AND ACETAMINOPHEN 10; 325 MG/1; MG/1
1 TABLET ORAL EVERY 4 HOURS PRN
Status: DISCONTINUED | OUTPATIENT
Start: 2024-05-09 | End: 2024-05-10 | Stop reason: HOSPADM

## 2024-05-09 RX ORDER — POLYETHYLENE GLYCOL 3350 17 G/17G
17 POWDER, FOR SOLUTION ORAL DAILY
Status: DISCONTINUED | OUTPATIENT
Start: 2024-05-09 | End: 2024-05-10 | Stop reason: HOSPADM

## 2024-05-09 RX ORDER — CEFAZOLIN SODIUM 2 G/100ML
2 INJECTION, SOLUTION INTRAVENOUS EVERY 8 HOURS
Qty: 200 ML | Refills: 0 | Status: COMPLETED | OUTPATIENT
Start: 2024-05-09 | End: 2024-05-10

## 2024-05-09 RX ORDER — OXYCODONE AND ACETAMINOPHEN 5; 325 MG/1; MG/1
1 TABLET ORAL EVERY 4 HOURS PRN
Qty: 36 TABLET | Refills: 0 | Status: SHIPPED | OUTPATIENT
Start: 2024-05-09

## 2024-05-09 RX ORDER — DOCUSATE SODIUM 100 MG/1
100 CAPSULE, LIQUID FILLED ORAL 2 TIMES DAILY
Status: DISCONTINUED | OUTPATIENT
Start: 2024-05-09 | End: 2024-05-10 | Stop reason: HOSPADM

## 2024-05-09 RX ORDER — ACETAMINOPHEN 325 MG/1
975 TABLET ORAL ONCE
Status: COMPLETED | OUTPATIENT
Start: 2024-05-09 | End: 2024-05-09

## 2024-05-09 RX ORDER — ONDANSETRON HYDROCHLORIDE 2 MG/ML
4 INJECTION, SOLUTION INTRAVENOUS EVERY 8 HOURS PRN
Status: DISCONTINUED | OUTPATIENT
Start: 2024-05-09 | End: 2024-05-10 | Stop reason: HOSPADM

## 2024-05-09 RX ORDER — OXYCODONE AND ACETAMINOPHEN 5; 325 MG/1; MG/1
0.5 TABLET ORAL EVERY 4 HOURS PRN
Status: DISCONTINUED | OUTPATIENT
Start: 2024-05-09 | End: 2024-05-10 | Stop reason: HOSPADM

## 2024-05-09 RX ORDER — MIDAZOLAM HYDROCHLORIDE 1 MG/ML
2 INJECTION, SOLUTION INTRAMUSCULAR; INTRAVENOUS ONCE
Status: COMPLETED | OUTPATIENT
Start: 2024-05-09 | End: 2024-05-09

## 2024-05-09 RX ORDER — EPINEPHRINE 1 MG/ML
INJECTION INTRAMUSCULAR; INTRAVENOUS; SUBCUTANEOUS AS NEEDED
Status: DISCONTINUED | OUTPATIENT
Start: 2024-05-09 | End: 2024-05-09 | Stop reason: HOSPADM

## 2024-05-09 RX ORDER — OXYCODONE HYDROCHLORIDE 5 MG/1
5 TABLET ORAL EVERY 6 HOURS PRN
Status: DISCONTINUED | OUTPATIENT
Start: 2024-05-09 | End: 2024-05-10 | Stop reason: HOSPADM

## 2024-05-09 RX ORDER — CYCLOBENZAPRINE HCL 10 MG
10 TABLET ORAL 3 TIMES DAILY PRN
Status: DISCONTINUED | OUTPATIENT
Start: 2024-05-09 | End: 2024-05-10 | Stop reason: HOSPADM

## 2024-05-09 RX ORDER — FENTANYL CITRATE 50 UG/ML
50 INJECTION, SOLUTION INTRAMUSCULAR; INTRAVENOUS ONCE
Status: COMPLETED | OUTPATIENT
Start: 2024-05-09 | End: 2024-05-09

## 2024-05-09 RX ORDER — ACETAMINOPHEN 325 MG/1
650 TABLET ORAL EVERY 6 HOURS SCHEDULED
Status: DISCONTINUED | OUTPATIENT
Start: 2024-05-09 | End: 2024-05-10 | Stop reason: HOSPADM

## 2024-05-09 RX ORDER — PHENYLEPHRINE HCL IN 0.9% NACL 1 MG/10 ML
SYRINGE (ML) INTRAVENOUS AS NEEDED
Status: DISCONTINUED | OUTPATIENT
Start: 2024-05-09 | End: 2024-05-09

## 2024-05-09 RX ORDER — SODIUM CHLORIDE 9 MG/ML
100 INJECTION, SOLUTION INTRAVENOUS CONTINUOUS
Status: DISCONTINUED | OUTPATIENT
Start: 2024-05-09 | End: 2024-05-10

## 2024-05-09 RX ORDER — CEFADROXIL 500 MG/1
500 CAPSULE ORAL 2 TIMES DAILY
Qty: 14 CAPSULE | Refills: 0 | Status: SHIPPED | OUTPATIENT
Start: 2024-05-09

## 2024-05-09 RX ORDER — CEFAZOLIN SODIUM 2 G/100ML
2 INJECTION, SOLUTION INTRAVENOUS EVERY 8 HOURS
Status: DISCONTINUED | OUTPATIENT
Start: 2024-05-09 | End: 2024-05-09 | Stop reason: HOSPADM

## 2024-05-09 RX ORDER — FENTANYL CITRATE 50 UG/ML
25 INJECTION, SOLUTION INTRAMUSCULAR; INTRAVENOUS EVERY 5 MIN PRN
Status: DISCONTINUED | OUTPATIENT
Start: 2024-05-09 | End: 2024-05-09 | Stop reason: HOSPADM

## 2024-05-09 RX ORDER — TRANEXAMIC ACID 100 MG/ML
INJECTION, SOLUTION INTRAVENOUS AS NEEDED
Status: DISCONTINUED | OUTPATIENT
Start: 2024-05-09 | End: 2024-05-09

## 2024-05-09 RX ORDER — BUPIVACAINE HYDROCHLORIDE 7.5 MG/ML
INJECTION, SOLUTION INTRASPINAL AS NEEDED
Status: DISCONTINUED | OUTPATIENT
Start: 2024-05-09 | End: 2024-05-09

## 2024-05-09 RX ORDER — CELECOXIB 200 MG/1
400 CAPSULE ORAL ONCE
Status: DISCONTINUED | OUTPATIENT
Start: 2024-05-09 | End: 2024-05-09 | Stop reason: HOSPADM

## 2024-05-09 RX ORDER — DOCUSATE SODIUM 100 MG/1
100 CAPSULE, LIQUID FILLED ORAL AS NEEDED
Qty: 10 CAPSULE | Refills: 0 | Status: SHIPPED | OUTPATIENT
Start: 2024-05-09

## 2024-05-09 RX ORDER — MORPHINE SULFATE 10 MG/ML
INJECTION, SOLUTION INTRAMUSCULAR; INTRAVENOUS AS NEEDED
Status: DISCONTINUED | OUTPATIENT
Start: 2024-05-09 | End: 2024-05-09 | Stop reason: HOSPADM

## 2024-05-09 RX ORDER — NAPROXEN SODIUM 220 MG/1
81 TABLET, FILM COATED ORAL EVERY 12 HOURS
Qty: 60 TABLET | Refills: 0 | Status: SHIPPED | OUTPATIENT
Start: 2024-05-09

## 2024-05-09 RX ORDER — ONDANSETRON 4 MG/1
4 TABLET, ORALLY DISINTEGRATING ORAL EVERY 8 HOURS PRN
Status: DISCONTINUED | OUTPATIENT
Start: 2024-05-09 | End: 2024-05-10 | Stop reason: HOSPADM

## 2024-05-09 RX ORDER — NAPROXEN 500 MG/1
500 TABLET ORAL 2 TIMES DAILY
Qty: 28 TABLET | Refills: 0 | Status: SHIPPED | OUTPATIENT
Start: 2024-05-09

## 2024-05-09 RX ADMIN — CEFAZOLIN SODIUM 2 G: 2 INJECTION, SOLUTION INTRAVENOUS at 12:33

## 2024-05-09 RX ADMIN — SODIUM CHLORIDE, SODIUM LACTATE, POTASSIUM CHLORIDE, AND CALCIUM CHLORIDE 50 ML/HR: 600; 310; 30; 20 INJECTION, SOLUTION INTRAVENOUS at 16:16

## 2024-05-09 RX ADMIN — MIDAZOLAM HYDROCHLORIDE 2 MG: 1 INJECTION, SOLUTION INTRAMUSCULAR; INTRAVENOUS at 12:04

## 2024-05-09 RX ADMIN — BUPIVACAINE HYDROCHLORIDE IN DEXTROSE 1.2 ML: 7.5 INJECTION, SOLUTION SUBARACHNOID at 12:29

## 2024-05-09 RX ADMIN — Medication 100 MCG: at 13:27

## 2024-05-09 RX ADMIN — SODIUM CHLORIDE, POTASSIUM CHLORIDE, SODIUM LACTATE AND CALCIUM CHLORIDE: 600; 310; 30; 20 INJECTION, SOLUTION INTRAVENOUS at 13:26

## 2024-05-09 RX ADMIN — KETOROLAC TROMETHAMINE 15 MG: 30 INJECTION, SOLUTION INTRAMUSCULAR at 22:13

## 2024-05-09 RX ADMIN — ASPIRIN 81 MG: 81 TABLET, COATED ORAL at 21:01

## 2024-05-09 RX ADMIN — SODIUM CHLORIDE, POTASSIUM CHLORIDE, SODIUM LACTATE AND CALCIUM CHLORIDE: 600; 310; 30; 20 INJECTION, SOLUTION INTRAVENOUS at 12:05

## 2024-05-09 RX ADMIN — ACETAMINOPHEN 975 MG: 325 TABLET ORAL at 10:12

## 2024-05-09 RX ADMIN — SODIUM CHLORIDE, POTASSIUM CHLORIDE, SODIUM LACTATE AND CALCIUM CHLORIDE: 600; 310; 30; 20 INJECTION, SOLUTION INTRAVENOUS at 12:44

## 2024-05-09 RX ADMIN — ACETAMINOPHEN 650 MG: 325 TABLET ORAL at 23:20

## 2024-05-09 RX ADMIN — CEFAZOLIN SODIUM 2 G: 2 INJECTION, SOLUTION INTRAVENOUS at 22:13

## 2024-05-09 RX ADMIN — Medication 50 MCG: at 13:37

## 2024-05-09 RX ADMIN — PROPOFOL 100 MCG/KG/MIN: 10 INJECTION, EMULSION INTRAVENOUS at 12:37

## 2024-05-09 RX ADMIN — DOCUSATE SODIUM 100 MG: 100 CAPSULE, LIQUID FILLED ORAL at 21:01

## 2024-05-09 RX ADMIN — TRANEXAMIC ACID 1000 MG: 100 INJECTION, SOLUTION INTRAVENOUS at 13:50

## 2024-05-09 RX ADMIN — FENTANYL CITRATE 50 MCG: 50 INJECTION INTRAMUSCULAR; INTRAVENOUS at 12:04

## 2024-05-09 RX ADMIN — TRANEXAMIC ACID 1000 MG: 100 INJECTION, SOLUTION INTRAVENOUS at 12:39

## 2024-05-09 RX ADMIN — KETOROLAC TROMETHAMINE 15 MG: 30 INJECTION, SOLUTION INTRAMUSCULAR at 16:20

## 2024-05-09 RX ADMIN — POLYETHYLENE GLYCOL 3350 17 G: 17 POWDER, FOR SOLUTION ORAL at 16:20

## 2024-05-09 RX ADMIN — ACETAMINOPHEN 650 MG: 325 TABLET ORAL at 17:57

## 2024-05-09 SDOH — SOCIAL STABILITY: SOCIAL INSECURITY: DO YOU FEEL UNSAFE GOING BACK TO THE PLACE WHERE YOU ARE LIVING?: NO

## 2024-05-09 SDOH — SOCIAL STABILITY: SOCIAL INSECURITY: HAS ANYONE EVER THREATENED TO HURT YOUR FAMILY OR YOUR PETS?: NO

## 2024-05-09 SDOH — SOCIAL STABILITY: SOCIAL INSECURITY: WERE YOU ABLE TO COMPLETE ALL THE BEHAVIORAL HEALTH SCREENINGS?: YES

## 2024-05-09 SDOH — HEALTH STABILITY: MENTAL HEALTH: CURRENT SMOKER: 0

## 2024-05-09 SDOH — SOCIAL STABILITY: SOCIAL INSECURITY: HAVE YOU HAD THOUGHTS OF HARMING ANYONE ELSE?: NO

## 2024-05-09 SDOH — SOCIAL STABILITY: SOCIAL INSECURITY: DO YOU FEEL ANYONE HAS EXPLOITED OR TAKEN ADVANTAGE OF YOU FINANCIALLY OR OF YOUR PERSONAL PROPERTY?: NO

## 2024-05-09 SDOH — SOCIAL STABILITY: SOCIAL INSECURITY: ABUSE: ADULT

## 2024-05-09 SDOH — SOCIAL STABILITY: SOCIAL INSECURITY: ARE THERE ANY APPARENT SIGNS OF INJURIES/BEHAVIORS THAT COULD BE RELATED TO ABUSE/NEGLECT?: NO

## 2024-05-09 SDOH — SOCIAL STABILITY: SOCIAL INSECURITY: ARE YOU OR HAVE YOU BEEN THREATENED OR ABUSED PHYSICALLY, EMOTIONALLY, OR SEXUALLY BY ANYONE?: NO

## 2024-05-09 SDOH — SOCIAL STABILITY: SOCIAL INSECURITY: DOES ANYONE TRY TO KEEP YOU FROM HAVING/CONTACTING OTHER FRIENDS OR DOING THINGS OUTSIDE YOUR HOME?: NO

## 2024-05-09 ASSESSMENT — PAIN SCALES - GENERAL
PAINLEVEL_OUTOF10: 0 - NO PAIN
PAINLEVEL_OUTOF10: 10 - WORST POSSIBLE PAIN
PAINLEVEL_OUTOF10: 0 - NO PAIN
PAINLEVEL_OUTOF10: 0 - NO PAIN
PAIN_LEVEL: 2
PAINLEVEL_OUTOF10: 0 - NO PAIN

## 2024-05-09 ASSESSMENT — COGNITIVE AND FUNCTIONAL STATUS - GENERAL
DAILY ACTIVITIY SCORE: 24
MOBILITY SCORE: 24
PATIENT BASELINE BEDBOUND: NO
DAILY ACTIVITIY SCORE: 24
WALKING IN HOSPITAL ROOM: A LITTLE
CLIMB 3 TO 5 STEPS WITH RAILING: A LITTLE
STANDING UP FROM CHAIR USING ARMS: A LITTLE
MOBILITY SCORE: 21

## 2024-05-09 ASSESSMENT — ACTIVITIES OF DAILY LIVING (ADL)
WALKS IN HOME: INDEPENDENT
DRESSING YOURSELF: INDEPENDENT
FEEDING YOURSELF: INDEPENDENT
JUDGMENT_ADEQUATE_SAFELY_COMPLETE_DAILY_ACTIVITIES: YES
HEARING - RIGHT EAR: FUNCTIONAL
BATHING: INDEPENDENT
PATIENT'S MEMORY ADEQUATE TO SAFELY COMPLETE DAILY ACTIVITIES?: YES
GROOMING: INDEPENDENT
TOILETING: INDEPENDENT
HEARING - LEFT EAR: FUNCTIONAL
ADEQUATE_TO_COMPLETE_ADL: YES
LACK_OF_TRANSPORTATION: NO

## 2024-05-09 ASSESSMENT — COLUMBIA-SUICIDE SEVERITY RATING SCALE - C-SSRS
6. HAVE YOU EVER DONE ANYTHING, STARTED TO DO ANYTHING, OR PREPARED TO DO ANYTHING TO END YOUR LIFE?: NO
2. HAVE YOU ACTUALLY HAD ANY THOUGHTS OF KILLING YOURSELF?: NO
1. IN THE PAST MONTH, HAVE YOU WISHED YOU WERE DEAD OR WISHED YOU COULD GO TO SLEEP AND NOT WAKE UP?: NO

## 2024-05-09 ASSESSMENT — LIFESTYLE VARIABLES
HOW OFTEN DO YOU HAVE A DRINK CONTAINING ALCOHOL: MONTHLY OR LESS
HOW MANY STANDARD DRINKS CONTAINING ALCOHOL DO YOU HAVE ON A TYPICAL DAY: 1 OR 2
HOW OFTEN DO YOU HAVE 6 OR MORE DRINKS ON ONE OCCASION: LESS THAN MONTHLY
SUBSTANCE_ABUSE_PAST_12_MONTHS: NO
AUDIT-C TOTAL SCORE: 2
SKIP TO QUESTIONS 9-10: 0
AUDIT-C TOTAL SCORE: 2
PRESCIPTION_ABUSE_PAST_12_MONTHS: NO

## 2024-05-09 ASSESSMENT — ENCOUNTER SYMPTOMS
APNEA: 0
DIFFICULTY URINATING: 0
ABDOMINAL PAIN: 0
WOUND: 0
SINUS PRESSURE: 0
BACK PAIN: 0
FACIAL ASYMMETRY: 0
FATIGUE: 0
LIGHT-HEADEDNESS: 0
ACTIVITY CHANGE: 1
SEIZURES: 0
SHORTNESS OF BREATH: 0
MYALGIAS: 1
NAUSEA: 0
DIZZINESS: 0
PALPITATIONS: 0
CONSTIPATION: 0
ABDOMINAL DISTENTION: 0
FEVER: 0
SPEECH DIFFICULTY: 0
CHEST TIGHTNESS: 0
COUGH: 0
SORE THROAT: 0
CHOKING: 0
HEMATURIA: 0
NUMBNESS: 0
ARTHRALGIAS: 1
APPETITE CHANGE: 0
CHILLS: 0

## 2024-05-09 ASSESSMENT — PATIENT HEALTH QUESTIONNAIRE - PHQ9
2. FEELING DOWN, DEPRESSED OR HOPELESS: NOT AT ALL
SUM OF ALL RESPONSES TO PHQ9 QUESTIONS 1 & 2: 0
1. LITTLE INTEREST OR PLEASURE IN DOING THINGS: NOT AT ALL

## 2024-05-09 ASSESSMENT — PAIN - FUNCTIONAL ASSESSMENT
PAIN_FUNCTIONAL_ASSESSMENT: 0-10

## 2024-05-09 ASSESSMENT — PAIN DESCRIPTION - DESCRIPTORS: DESCRIPTORS: ACHING;THROBBING

## 2024-05-09 NOTE — CONSULTS
Inpatient consult to Medicine  Consult performed by: FAHEEM Bueno-CNP  Consult ordered by: Sammy Wise MD  Reason for consult: management of GERD, hyperlipidemia in a post op patient          Reason For Consult  management of GERD, hyperlipidemia in a post op patient     History Of Present Illness  Domenica Estrada is a 78 y.o. female who presented today for left total knee by Dr. Perez. Patient has a history of osteoarthritis, osteopenia, anxiety and hyperlipidemia. She is independent and lives at home with her spouse. She her PCP regularly and her only prescription medication is hydroxyzine as needed for anxiety.Patient is still having numbness to left leg below her knee and unable to work with therapy because of that.  At discharge patient plans to go home with [facility]-therapy.       Past Medical History  She has a past medical history of Anxiety, GERD (gastroesophageal reflux disease), HLD (hyperlipidemia), Osteoarthritis, and Osteopenia.    Surgical History  She has a past surgical history that includes Appendectomy and Cholecystectomy.     Social History  She reports that she has never smoked. She has never used smokeless tobacco. She reports that she does not currently use alcohol. She reports that she does not use drugs.    Family History  No family history on file.     Allergies  Mobic [meloxicam]    Review of Systems  Review of Systems   Constitutional:  Positive for activity change. Negative for appetite change, chills, fatigue and fever.   HENT:  Negative for congestion, nosebleeds, sinus pressure and sore throat.    Respiratory:  Negative for apnea, cough, choking, chest tightness and shortness of breath.    Cardiovascular:  Negative for chest pain, palpitations and leg swelling.   Gastrointestinal:  Negative for abdominal distention, abdominal pain, constipation and nausea.   Genitourinary:  Negative for difficulty urinating, hematuria and urgency.   Musculoskeletal:  Positive for  arthralgias, gait problem and myalgias. Negative for back pain.   Skin:  Negative for rash and wound.   Neurological:  Negative for dizziness, seizures, facial asymmetry, speech difficulty, light-headedness and numbness.          Physical Exam  Physical Exam  Constitutional:       Appearance: Normal appearance.   Cardiovascular:      Rate and Rhythm: Normal rate and regular rhythm.      Pulses: Normal pulses.      Heart sounds: Normal heart sounds.   Pulmonary:      Effort: Pulmonary effort is normal.      Breath sounds: Normal breath sounds.   Abdominal:      General: Bowel sounds are normal.      Palpations: Abdomen is soft.   Musculoskeletal:         General: Normal range of motion.   Skin:     General: Skin is warm and dry.   Neurological:      Mental Status: She is alert and oriented to person, place, and time.      Sensory: Sensory deficit present.      Comments: Patient has numbness to from left knee to toes. Pulses are good to foot and her foot is warm. She is able to move toes and ankle. Sensation above knee on left leg is intact.               Last Recorded Vitals  /88   Pulse 91   Temp 36.1 °C (97 °F) (Temporal)   Resp 21   Wt 59.9 kg (132 lb)   SpO2 98%     Relevant Results  No results found for this or any previous visit (from the past 24 hour(s)).       Assessment/Plan     Osteoarthritis with left total knee  Pain medication per surgery  PT/OT in am  DVT prophylaxis  Mechanical compression  Medication per surgery  Plan of Care  Home medication reviewed  Advanced directives discussed with with patient.  She does not have a living will.  She wishes to be a DNR DNI.   Patient plans to go home at discharge with her spouse and home health care.  Plan of care discussed with patient, spouse and family at bedside and collaborating physician Dr. Murillo.    Cinthya Gonzalez, APRN-CNP

## 2024-05-09 NOTE — ANESTHESIA POSTPROCEDURE EVALUATION
Patient: Domenica Estrada    Procedure Summary       Date: 05/09/24 Room / Location: [facility] / [facility]    Anesthesia Start: 1218 Anesthesia Stop: 1422    Procedure: Knee Replacement Total Cement Unilat (Left: Knee) Diagnosis:       Acute bilateral knee pain      (Acute bilateral knee pain [M25.561, M25.562])    Surgeons: Sammy Wise MD Responsible Provider: Soto Cason DO    Anesthesia Type: general, spinal ASA Status: 3            Anesthesia Type: general, spinal    Vitals Value Taken Time   /58 05/09/24 1422   Temp 36 05/09/24 1422   Pulse 80 05/09/24 1422   Resp 18 05/09/24 1422   SpO2 100   05/09/24 1422       Anesthesia Post Evaluation    Patient location during evaluation: PACU  Patient participation: complete - patient participated  Level of consciousness: awake and alert  Pain score: 2  Pain management: adequate  Airway patency: patent  Cardiovascular status: acceptable  Respiratory status: acceptable  Hydration status: acceptable  Postoperative Nausea and Vomiting: none        No notable events documented.

## 2024-05-09 NOTE — PROGRESS NOTES
Physical Therapy                 Therapy Communication Note    Patient Name: Domenica Estrada  MRN: [id number]  Today's Date: 5/9/2024     Discipline: Physical Therapy    Missed Visit Reason:      Missed Time: Cancel    Comment: Pt to the floor for extended stay overnight. Will see for PT eval in AM.

## 2024-05-09 NOTE — ANESTHESIA PROCEDURE NOTES
Peripheral Block    Patient location during procedure: pre-op  Start time: 5/9/2024 12:08 PM  End time: 5/9/2024 12:10 PM  Reason for block: at surgeon's request  Staffing  Performed: attending and CRNA   Authorized by: Soto Cason DO    Performed by: Soto Cason DO  Preanesthetic Checklist  Completed: patient identified, IV checked, site marked, risks and benefits discussed, surgical consent, monitors and equipment checked, pre-op evaluation and timeout performed   Timeout performed at: 5/9/2024 12:03 PM  Peripheral Block  Patient position: laying flat  Prep: ChloraPrep  Patient monitoring: heart rate, cardiac monitor and continuous pulse ox  Block type: adductor canal  Laterality: left  Injection technique: single-shot  Guidance: ultrasound guided  Local infiltration: bupivicaine  Infiltration strength: 0.5 %  Dose: 25 mL  Needle  Needle type: short-bevel   Needle gauge: 22 G  Needle length: 10 cm  Needle localization: ultrasound guidance     image stored in chart  Needle insertion depth: 5 cm  Assessment  Injection assessment: negative aspiration for heme, no paresthesia on injection, incremental injection and local visualized surrounding nerve on ultrasound  Paresthesia pain: none  Heart rate change: no  Slow fractionated injection: yes

## 2024-05-09 NOTE — DISCHARGE INSTRUCTIONS
TOTAL HIP AND KNEE REPLACEMENT DISCHARGE INSTRUCTIONS  Sammy Wise MD      DRIVING & TRAVEL AFTER SURGERY   Patients should anticipate waiting at least 4-6 weeks before traveling long distances after surgery.  You will need to stop to walk around ever 1 hour during your travel to help with blood clot prevention.  Please call the office or your joint nurse to discuss prior to post-surgical travel.  Patients may not drive until cleared by the joint nurse or the office.    DENTAL PROCEDURES & CLEANINGS  You must wait a minimum of 3 months for elective dental appointments, including routine cleanings or dental work including bridges, crowns, extractions, etc..  For any dental visit - cleaning or dental procedures - patients must take an antibiotic 1 hour before the appointment.  Antibiotics are a lifelong need before dental appointments.  The antibiotic prescribed will be based on each patient's allergies.    WOUND CARE  If you experience continued drainage or bleeding, you may cover with abdominal pads (purchase at local drug store).  Knee replacements should wrap with an ace wrap.  Do not remove surgical dressing, it will be removed when you arrive in clinic for follow up visit.   Mesh dressing under the bandage will remain in place until it peels off on its own.  If it is loose, you may gently remove.  Do not remove if pulling causes resistance against the incision.      PAIN, SWELLING, BRUISING & CLICKING  Pain and swelling are a natural part of your recovery which is considered normal fur up to a year after surgery.  Symptoms may be treated with movement, ice, compression stockings, elevating your leg, and by following the pain medication regimen as prescribed.  Bruising is normal for several weeks after surgery and will run down the leg over time.  You may ice areas that are tender to help with discomfort.  You are required to wear the provided compression stockings, every day, for 4 weeks following surgery.   Remove the stockings at night and place them back on in the morning.  Pain and swelling may temporarily increase with an increase in activity or exercise.  Use ice after activity.  Audible clicking with movement or exercises is considered normal following joint replacement.  You may also feel decreased sensation or numbness near the incision site.  These are usually normal and may or may not fade over time.     PERSONAL HYGIENE  You may shower upon discharging from the hospital.  Soap and water is permitted to run over the surgical dressing, steri-strips and incision.  Do not scrub directly over these items.  DO NOT soak your incision in a bath, hot tub, pool or pond/lake for a minimum of 8 weeks following your surgery.  DO NOT use lotions, creams, ointments on your wound for a minimum of 6 weeks following your surgery. At that time you may use vitamin E to assist with softening of your incision.      RESTARTING HOME ROUTINE - DIET & MEDICATIONS  Post-operative constipation can result due to a combination of inactivity, anesthesia and pain medication. To help prevent this, you should increase your water and fiber intake. Physical activity such as walking will also help stimulate the bowels.   You may resume your normal diet when you discharge home.  Choose foods that help promote good bowel habits and prevent constipation, such as foods high in fiber.  You may restart your home medications the following day after your surgery UNLESS you have been given alternate instructions.  Follow the instructions given to you on your hospital discharge instructions for more information regarding your home medications.      IN-HOME PHYSICAL THERAPY & OUTPATIENT PHYSICAL THERAPY  In-home physical therapy will start 1-2 days after you get home from the hospital.    The home care agency will call within the first 24-48 hours to set up their first visit.  Please do not call your care team to inquire during this timeframe.  Continue  the exercises you were given in the hospital until you have been seen by in-home therapy.  Make sure to provide a phone number with the ability for the home care staff to leave a message if you do not answer your phone.    Outpatient physical therapy following knee replacement surgery should begin 2-3 weeks after surgery.  You should call to schedule this appointment ASAP if not already scheduled before surgery.  Waiting until you are ready for outpatient physical therapy will cause a delay in your care.  You may choose any outpatient physical therapy location.  Call the office for an order if needed.    EMERGENCIES - WHEN TO CONTACT THE SURGEON'S OFFICE IMMEDIATELY  Fever >101 with chills that has been present for at least 48 hours.   Excessive bleeding from incision that will not slow down. A small amount of drainage is normal and expected.  Once pressure is applied and the area is covered, do not continue to check the area regularly.  This will remove pressure and bleeding will continue.  Leave in place for 4-6 hours.  Signs of infection of incision-excessive drainage that is soaking through your dressing (especially if it is pus-like), redness that is spreading out from the edges of your incision, or increased warmth around the area.  Excruciating pain for which the pain medication, taken as instructed, is not helping.  Severe calf pain.  Go directly to the emergency room or call 911, if you are experiencing chest pain or difficulty breathing.    ICE & COLD THERAPY INSTRUCTIONS    To assist with pain control and post-op swelling, you should be using ice regularly throughout recovery, especially for the first 6 weeks, regardless of the cold therapy method you use.      Always make sure there is a layer of protection between the cold pad and your skin.    If you are using ICE PACKS or GEL PACKS, you will need to alternate 20 minutes on, 20 minutes off twice per hour.    If you are using an ICE MACHINE, please  follow the provided ice machine instructions.  These devices differ from ice or ice packs whereas the mechanism circulates water through tubing and a pad to provide longer periods of cold therapy to the desired site.  You can use your cold devices around the clock for optimal comfort.  We recommend using cold therapy after working with therapy or completing exercises on your own.  There is no set schedule in which you must follow while using cold therapy.  Below are a few points to remember when using a cold therapy device:    You do not need to need to use the 20 on, 20 off method.  Detach the pad from the cooler and ambulate at least once every hour.  You can check your skin under the pad at this time.  You may wear the cold therapy device during periods of sleep including overnight.  If you wake up during the night, you can check the skin at this time.  You do not need to wake up specifically to perform skin checks.  Empty the cooler and pad when device is not in use.  Follow 's instructions for cleaning your cold therapy device.      DISCHARGE MEDICATIONS    PAIN MEDICATION    ____ Oxycodone-acetaminophen  Oxycodone-acetaminophen has been prescribed for post-operative pain control.    These medications may only be refilled if neededONCE every 7 days for a period of up to 6 weeks following surgery.  After 6 weeks, you will transition to acetaminophen and over -the- counter anti-inflammatories such as Ibuprofen, Advil or Aleve in conjunction with ICE/COLD THERAPY.   Side effects may be constipation and nausea, vomiting, sleepiness, dizziness, lightheadedness, headache, blurred vision, dry mouth sweating, itching (if you have itching, over-the -counter Benadryl can be used as needed).  You may NOT operate a motor vehicle while taking these medications or have been cleared by your care team.    Please call the office for a refill request.  The office staff and orthopedic nurses cannot refill medications;  messages should be left directly through the office.  Please do not call multiple times or call other members of the care team for medication needs, this will cause the refill to take longer.    Per State and Institutional policy, pain medications can only be refilled every 7 days for up to six weeks following surgery.   .    _______ Naproxen has been prescribed as an adjunct anti-inflammatory to assist in pain control.    Take one 500 mg tablet twice a day for 4 weeks.  You will not receive refills on this medication.   Side effects may include nausea.  May not be prescribed if you are on a more potent blood thinner than aspirin or have chronic kidney disease.    BLOOD THINNER    ____ Blood Thinner   A blood thinner has been prescribed to prevent blood clots in your leg or lungs. Take as prescribed on the bottle for 4 weeks. You will not receive a refill on this medication.      STOOL SOFTENERS    ____ Colace (Docusate Sodium)   Take this medication to help with constipation while using the Oxycodone for pain control.  You will not receive a refill on this medication.    Antibiotics    ____ Cefadroxil or doxycycline  May be prescribed if needed for prophylaxis against infection   Take 7 day course of antibiotics until they are all gone  May not be prescribed if you do not meet criteria for elevated infection risk after surgery          You will not receive refills on the following medications:    Naproxen  Colace  Blood Thinner

## 2024-05-09 NOTE — ANESTHESIA PREPROCEDURE EVALUATION
Patient: Domenica Estrada    Procedure Information       Date/Time: 05/09/24 1200    Procedure: Knee Replacement Total Cement Unilat (Left: Knee) - depuy attune    Location: [facility] / [facility]    Surgeons: Sammy Wise MD            Relevant Problems   No relevant active problems       Clinical information reviewed:   Tobacco  Allergies  Meds   Med Hx  Surg Hx  OB Status  Fam Hx  Soc   Hx        NPO Detail:  NPO/Void Status  Carbohydrate Drink Given Prior to Surgery? : N  Date of Last Liquid: 05/09/24  Time of Last Liquid: 0700  Date of Last Solid: 05/09/24  Time of Last Solid: 2000  Last Intake Type: Clear fluids  Time of Last Void: 0930         Physical Exam    Airway  Mallampati: II  TM distance: >3 FB  Neck ROM: full     Cardiovascular   Rhythm: regular  Rate: normal     Dental - normal exam     Pulmonary   Breath sounds clear to auscultation     Abdominal   Abdomen: soft             Anesthesia Plan    History of general anesthesia?: yes  History of complications of general anesthesia?: no    ASA 3     general and spinal     The patient is not a current smoker.    intravenous induction   Postoperative administration of opioids is intended.  Anesthetic plan and risks discussed with patient.    Plan discussed with CRNA, attending and CAA.

## 2024-05-09 NOTE — OP NOTE
Knee Replacement Total Cement Unilat (L) Operative Note     Date: 2024  OR Location: [facility]    Name: Domenica Estrada, : 1945, Age: 78 y.o., MRN: [id number], Sex: female    Diagnosis  Pre-op Diagnosis     * Acute bilateral knee pain [M25.561, M25.562] Post-op Diagnosis     * Acute bilateral knee pain [M25.561, M25.562]     Procedures  Knee Replacement Total Cement Unilat  03264 - VT ARTHRP KNE CONDYLE&PLATU MEDIAL&LAT COMPARTMENTS      Surgeons      * Sammy Wise - Primary    Resident/Fellow/Other Assistant:  Surgeons and Role:  * No surgeons found with a matching role *    Procedure Summary  Anesthesia: Spinal  ASA: III  Anesthesia Staff: Anesthesiologist: Soto Cason DO  C-AA: KRAIG Santiago  Estimated Blood Loss: 50mL  Intra-op Medications:   Administrations occurring from 1200 to 1400 on 24:   Medication Name Total Dose   ropivacaine (Naropin) injection 30 mL   EPINEPHrine (Adrenalin) injection 1 mg   sodium chloride (PF) 0.9% solution 20 mL   morphine injection 2 mg   ceFAZolin in dextrose (iso-os) (Ancef) IVPB 2 g 2 g   fentaNYL PF (Sublimaze) injection 50 mcg 50 mcg   midazolam (Versed) injection 2 mg 2 mg              Anesthesia Record               Intraprocedure I/O Totals          Intake    Tranexamic Acid 0.00 mL    The total shown is the total volume documented since Anesthesia Start was filed.    Propofol Drip 0.00 mL    The total shown is the total volume documented since Anesthesia Start was filed.    LR infusion 1000.00 mL    Total Intake 1000 mL          Specimen:   ID Type Source Tests Collected by Time   1 : left total knee contens Tissue KNEE CONTENS REAMINGS, LEFT SURGICAL PATHOLOGY EXAM Sammy Wise MD 2024 1253        Staff:   Circulator: Pako Robles RN  Relief Circulator: Mynor Bautista RN  Scrub Person: Nancy Smyth         Drains and/or Catheters: * None in log *    Tourniquet Times:   * Missing tourniquet times found for  documented tourniquets in lo *     Implants:  Implants       Type Name Action Serial No.       SIMPLEX P BONE CEMENT, RADIOPAQUE, FULL DOSE (EA) 2 MFR REPORTS NO LONGER SOLD AS EACH, PLEASE TRANSITION TO CAT# 6191-1-010 (PK=10) Implanted       SIMPLEX P BONE CEMENT, RADIOPAQUE, FULL DOSE (EA) 2 MFR REPORTS NO LONGER SOLD AS EACH, PLEASE TRANSITION TO CAT# 6191-1-010 (PK=10) Implanted      Joint Knee TIBAL BASE, ATTUNE, ROTATING PLATFORM, VALERIA, SZ 4 - [id number] Implanted      Joint Knee INSERT, ATTUNE PS RP, SZ 4, 6MM - [id number] Implanted      Joint Knee FEMORAL, ATTUNE PS, VALERIA, SZ 4, LT - [id number] Implanted      Joint Knee DOME, PATELLA, MEDIALIZED, 35MM - [id number] Implanted               Findings: severe djd    Indications: Domenica Estrada is an 78 y.o. female who is having surgery for Acute bilateral knee pain [M25.561, M25.562].     The patient was seen in the preoperative area. The risks, benefits, complications, treatment options, non-operative alternatives, expected recovery and outcomes were discussed with the patient. The possibilities of reaction to medication, pulmonary aspiration, injury to surrounding structures, bleeding, recurrent infection, the need for additional procedures, failure to diagnose a condition, and creating a complication requiring transfusion or operation were discussed with the patient. The patient concurred with the proposed plan, giving informed consent.  The site of surgery was properly noted/marked if necessary per policy. The patient has been actively warmed in preoperative area. Preoperative antibiotics have been ordered and given within 1 hours of incision. Venous thrombosis prophylaxis have been ordered including bilateral sequential compression devices and chemical prophylaxis    Procedure Details: Statement of medical necessity:    This is a 78 y.o. female with severe degenerative disease of the knee that has failed non operative management. the risks, benefits and  alternatives of total knee arthroplasty were discussed with the patient and they signed informed consent.       Description of procedure:   The patient was brought to the operating room and placed on the operating table in the supine position. All bony prominences were well padded. Appropriate preoperative antibiotics were administered. Anesthesia was induced by the anesthesia team and a time out was performed. The patient was identified by name and medical record number and the laterality and the site of surgery were confirmed by all present.     Under pneumatic tourniquet control, a longitudinal anterior skin incision was made with medial parapatellar arthrotomy. Hemostasis was obtained with Bovie electrocautery. Comprehensive exposure the knee was carried out for total knee arthroplasty. The patella was subluxed laterally and the knee placed in a flexed position.      Using intramedullary alignment, the distal femur was cut in a 5° valgus position. The appropriate-sized femoral component was selected based upon intraoperative measurements of the sagittal dimension of the femur. The distal femoral cutting guide was placed perpendicular to Whitesides line and parallel with the transepicondylar axis, with  3° of external rotation based upon the posterior condylar axis. The distal femur was then finished to accept a posterior stabilized prosthesis.     Attention was then directed to the proximal tibia. Meniscal remnants were excised. Using extramedullary alignment, the proximal tibia was cut perpendicular to its longitudinal axis with a 3° posterior slope. Osteophytes were excised from the posterior femur, medial and lateral femur, and circumferentially about the tibia to avoid soft tissue impingement. The posterior capsule, medial lateral soft tissue structures were injected using combination of Duramorph, Ropivicaine, toradol and epinephrine.Flexion and extension gaps were assessed.     Flexion and extension gaps  were equal and rectangular. No ligamentous release was necessary for appropriate balance.     Trial components were placed. Knee achieved full extension and flexion with excellent varus and valgus stability throughout range of motion. The patella tracked centrally. Tibial component rotation was marked, and the tibia finished in the usual fashion to accept an appropriately sized tibial component.     bone surfaces prepared with pulsatile saline irrigation. Final implants were inserted using Simplex cement. Cement was pressurized and excess cement was cleared. Cement was further pressurized with a trial articular surface in place while the knee was in full extension.     The patella was assessed and its surfaces judged appropriate for prosthetic resurfacing patellar arthroplasty. Patellar was measured and cut using an oscillating saw.  It was prepared for prosthetic resurfacing in the standard fashion. Additionally,  excision of synovium from the periphery of the patella and circumferential electrocautery and excision of ostephytes was also performed. with the trial component in place there was excellent patellar tracking and mechanics.     After hardening of cement, mechanics of the knee were again assessed. The final tibial articular surface trial was accepted for final implant sizing.     After irrigation, the definitive tibial articular surface was inserted into the tibial tray and locking mechanism engaged.     Joint was irrigated with dilute betadaine solution, followed by normal saline.   The arthrotomy was reapproximated using #1 poly and #2 quill sutures. Subcutaneous layer was closed with 2-0 poly, subcuticular layer with 3-0 v-lock, then perineo mesh and glue dressing was placed followed by mepilex, cast padding, and ace wrap.     Patient was awoken from anesthesia and brought to the pacu in stable position.     Post operative plan: patient may bear weight as tolerated, anterior hip precautions, antibiotics  and dvt prophylaxis per protocol, standard post operative supportive care    Statement of staff presence: I was present and scrubbed for all critical portions of the procedure and was immediately available during closing      Complications:  None; patient tolerated the procedure well.    Disposition: PACU - hemodynamically stable.  Condition: stable         Additional Details: none    Attending Attestation: I was present and scrubbed for the key portions of the procedure.    Sammy Wise  Phone Number: [phone]

## 2024-05-09 NOTE — CARE PLAN
The patient's goals for the shift include pain control    The clinical goals for the shift include pain control      Problem: Pain  Goal: Takes deep breaths with improved pain control throughout the shift  Outcome: Progressing  Goal: Turns in bed with improved pain control throughout the shift  Outcome: Progressing  Goal: Walks with improved pain control throughout the shift  Outcome: Progressing  Goal: Performs ADL's with improved pain control throughout shift  Outcome: Progressing  Goal: Participates in PT with improved pain control throughout the shift  Outcome: Progressing  Goal: Free from opioid side effects throughout the shift  Outcome: Progressing  Goal: Free from acute confusion related to pain meds throughout the shift  Outcome: Progressing     Problem: Deep Vein Thrombosis  Goal: I will remain free from complications of deep vein thrombosis and maintain current level of mobility  Outcome: Progressing     Problem: Fall/Injury  Goal: Not fall by end of shift  Outcome: Progressing  Goal: Be free from injury by end of the shift  Outcome: Progressing  Goal: Verbalize understanding of personal risk factors for fall in the hospital  Outcome: Progressing  Goal: Verbalize understanding of risk factor reduction measures to prevent injury from fall in the home  Outcome: Progressing  Goal: Use assistive devices by end of the shift  Outcome: Progressing  Goal: Pace activities to prevent fatigue by end of the shift  Outcome: Progressing

## 2024-05-09 NOTE — ANESTHESIA POSTPROCEDURE EVALUATION
Patient: Domenica Estrada    Procedure Summary       Date: 05/09/24 Room / Location: [facility] / [facility]    Anesthesia Start: 1218 Anesthesia Stop: 1422    Procedure: Knee Replacement Total Cement Unilat (Left: Knee) Diagnosis:       Acute bilateral knee pain      (Acute bilateral knee pain [M25.561, M25.562])    Surgeons: Sammy Wise MD Responsible Provider: Soto Cason DO    Anesthesia Type: general, spinal ASA Status: 3            Anesthesia Type: general, spinal    /61   Pulse 79   Temp 36.5 °C (97.7 °F) (Temporal)   Resp 11   Wt 59.9 kg (132 lb)   LMP  (LMP Unknown)   SpO2 98%   BMI 24.94 kg/m²     Anesthesia Post Evaluation    Patient location during evaluation: bedside  Patient participation: complete - patient participated  Level of consciousness: awake  Pain score: 2  Pain management: adequate  Airway patency: patent  Two or more strategies used to mitigate risk of obstructive sleep apnea  Cardiovascular status: acceptable  Respiratory status: acceptable  Hydration status: acceptable  Postoperative Nausea and Vomiting: none        No notable events documented.

## 2024-05-09 NOTE — ANESTHESIA PROCEDURE NOTES
Spinal Block    Patient location during procedure: OR  Start time: 5/9/2024 12:23 PM  End time: 5/9/2024 12:30 PM  Reason for block: primary anesthetic  Staffing  Performed: KRAIG   Authorized by: Soto Cason DO    Performed by: KRAIG Santiago    Preanesthetic Checklist  Completed: patient identified, IV checked, risks and benefits discussed, surgical consent, pre-op evaluation, timeout performed and sterile techniques followed  Block Timeout  RN/Licensed healthcare professional reads aloud to the Anesthesia provider and entire team: Patient identity, procedure with side and site, patient position, and as applicable the availability of implants/special equipment/special requirements.  Patient on coagulant treatment: no  Timeout performed at: 5/9/2024 12:30 PM  Spinal Block  Patient position: sitting  Prep: Betadine  Sterility prep: gloves and drape  Sedation level: moderate sedation  Patient monitoring: blood pressure  Vertebral space: L2-3  Injection technique: single-shot  Needle  Needle gauge: 25 G  Needle length: 3.5 in  Free flowing CSF: yes    Assessment  Sensory level: T6  Procedure assessment: patient sedated but conversant throughout procedure and patient tolerated procedure well with no immediate complications  Additional Notes  O.75% Marcaine x 1.2cc injected without diff.

## 2024-05-10 ENCOUNTER — DOCUMENTATION (OUTPATIENT)
Dept: HOME HEALTH SERVICES | Facility: HOME HEALTH | Age: 79
End: 2024-05-10
Payer: MEDICARE

## 2024-05-10 ENCOUNTER — PHARMACY VISIT (OUTPATIENT)
Dept: PHARMACY | Facility: CLINIC | Age: 79
End: 2024-05-10
Payer: MEDICARE

## 2024-05-10 VITALS
DIASTOLIC BLOOD PRESSURE: 80 MMHG | HEART RATE: 87 BPM | RESPIRATION RATE: 16 BRPM | OXYGEN SATURATION: 98 % | WEIGHT: 131.84 LBS | HEIGHT: 61 IN | TEMPERATURE: 97.9 F | BODY MASS INDEX: 24.89 KG/M2 | SYSTOLIC BLOOD PRESSURE: 139 MMHG

## 2024-05-10 LAB
ANION GAP SERPL CALC-SCNC: 7 MMOL/L
BUN SERPL-MCNC: 10 MG/DL (ref 8–25)
CALCIUM SERPL-MCNC: 8.7 MG/DL (ref 8.5–10.4)
CHLORIDE SERPL-SCNC: 106 MMOL/L (ref 97–107)
CO2 SERPL-SCNC: 27 MMOL/L (ref 24–31)
CREAT SERPL-MCNC: 0.9 MG/DL (ref 0.4–1.6)
EGFRCR SERPLBLD CKD-EPI 2021: 65 ML/MIN/1.73M*2
ERYTHROCYTE [DISTWIDTH] IN BLOOD BY AUTOMATED COUNT: 13.6 % (ref 11.5–14.5)
GLUCOSE SERPL-MCNC: 113 MG/DL (ref 65–99)
HCT VFR BLD AUTO: 39.1 % (ref 36–46)
HGB BLD-MCNC: 12.5 G/DL (ref 12–16)
MCH RBC QN AUTO: 27.5 PG (ref 26–34)
MCHC RBC AUTO-ENTMCNC: 32 G/DL (ref 32–36)
MCV RBC AUTO: 86 FL (ref 80–100)
NRBC BLD-RTO: 0 /100 WBCS (ref 0–0)
PLATELET # BLD AUTO: 129 X10*3/UL (ref 150–450)
POTASSIUM SERPL-SCNC: 4.5 MMOL/L (ref 3.4–5.1)
RBC # BLD AUTO: 4.55 X10*6/UL (ref 4–5.2)
SODIUM SERPL-SCNC: 140 MMOL/L (ref 133–145)
WBC # BLD AUTO: 5.8 X10*3/UL (ref 4.4–11.3)

## 2024-05-10 PROCEDURE — 85027 COMPLETE CBC AUTOMATED: CPT

## 2024-05-10 PROCEDURE — 7100000011 HC EXTENDED STAY RECOVERY HOURLY - NURSING UNIT

## 2024-05-10 PROCEDURE — RXMED WILLOW AMBULATORY MEDICATION CHARGE

## 2024-05-10 PROCEDURE — 97116 GAIT TRAINING THERAPY: CPT | Mod: GP

## 2024-05-10 PROCEDURE — 82374 ASSAY BLOOD CARBON DIOXIDE: CPT

## 2024-05-10 PROCEDURE — 97161 PT EVAL LOW COMPLEX 20 MIN: CPT | Mod: GP

## 2024-05-10 PROCEDURE — 2500000004 HC RX 250 GENERAL PHARMACY W/ HCPCS (ALT 636 FOR OP/ED): Mod: JZ

## 2024-05-10 PROCEDURE — 97110 THERAPEUTIC EXERCISES: CPT | Mod: GP

## 2024-05-10 PROCEDURE — 36415 COLL VENOUS BLD VENIPUNCTURE: CPT

## 2024-05-10 PROCEDURE — 2500000001 HC RX 250 WO HCPCS SELF ADMINISTERED DRUGS (ALT 637 FOR MEDICARE OP)

## 2024-05-10 RX ADMIN — ASPIRIN 81 MG: 81 TABLET, COATED ORAL at 09:47

## 2024-05-10 RX ADMIN — ACETAMINOPHEN 650 MG: 325 TABLET ORAL at 05:08

## 2024-05-10 RX ADMIN — CEFAZOLIN SODIUM 2 G: 2 INJECTION, SOLUTION INTRAVENOUS at 05:08

## 2024-05-10 RX ADMIN — DOCUSATE SODIUM 100 MG: 100 CAPSULE, LIQUID FILLED ORAL at 09:47

## 2024-05-10 RX ADMIN — KETOROLAC TROMETHAMINE 15 MG: 30 INJECTION, SOLUTION INTRAMUSCULAR at 03:34

## 2024-05-10 ASSESSMENT — COGNITIVE AND FUNCTIONAL STATUS - GENERAL
STANDING UP FROM CHAIR USING ARMS: A LITTLE
DAILY ACTIVITIY SCORE: 24
CLIMB 3 TO 5 STEPS WITH RAILING: A LITTLE
WALKING IN HOSPITAL ROOM: A LITTLE
CLIMB 3 TO 5 STEPS WITH RAILING: A LITTLE
TURNING FROM BACK TO SIDE WHILE IN FLAT BAD: A LITTLE
MOBILITY SCORE: 21
MOVING TO AND FROM BED TO CHAIR: A LITTLE
WALKING IN HOSPITAL ROOM: A LITTLE
MOBILITY SCORE: 20

## 2024-05-10 ASSESSMENT — ACTIVITIES OF DAILY LIVING (ADL): ADL_ASSISTANCE: INDEPENDENT

## 2024-05-10 ASSESSMENT — PAIN - FUNCTIONAL ASSESSMENT
PAIN_FUNCTIONAL_ASSESSMENT: 0-10
PAIN_FUNCTIONAL_ASSESSMENT: 0-10

## 2024-05-10 ASSESSMENT — PAIN SCALES - GENERAL
PAINLEVEL_OUTOF10: 0 - NO PAIN
PAINLEVEL_OUTOF10: 0 - NO PAIN
PAINLEVEL_OUTOF10: 2

## 2024-05-10 NOTE — CARE PLAN
The patient's goals for the shift include pain control    The clinical goals for the shift include pain management, out of bed, increase mobility, safety, rest      Problem: Pain  Goal: Takes deep breaths with improved pain control throughout the shift  Outcome: Progressing  Goal: Turns in bed with improved pain control throughout the shift  Outcome: Progressing  Goal: Walks with improved pain control throughout the shift  Outcome: Progressing  Goal: Performs ADL's with improved pain control throughout shift  Outcome: Progressing  Goal: Participates in PT with improved pain control throughout the shift  Outcome: Progressing  Goal: Free from opioid side effects throughout the shift  Outcome: Progressing  Goal: Free from acute confusion related to pain meds throughout the shift  Outcome: Progressing

## 2024-05-10 NOTE — CARE PLAN
Problem: Pain  Goal: Takes deep breaths with improved pain control throughout the shift  Outcome: Progressing  Goal: Turns in bed with improved pain control throughout the shift  Outcome: Progressing  Goal: Walks with improved pain control throughout the shift  Outcome: Progressing  Goal: Performs ADL's with improved pain control throughout shift  Outcome: Progressing  Goal: Free from opioid side effects throughout the shift  Outcome: Progressing  Goal: Free from acute confusion related to pain meds throughout the shift  Outcome: Progressing     Problem: Deep Vein Thrombosis  Goal: I will remain free from complications of deep vein thrombosis and maintain current level of mobility  Outcome: Progressing     Problem: Fall/Injury  Goal: Not fall by end of shift  Outcome: Progressing  Goal: Be free from injury by end of the shift  Outcome: Progressing  Goal: Verbalize understanding of personal risk factors for fall in the hospital  Outcome: Progressing  Goal: Verbalize understanding of risk factor reduction measures to prevent injury from fall in the home  Outcome: Progressing  Goal: Use assistive devices by end of the shift  Outcome: Progressing  Goal: Pace activities to prevent fatigue by end of the shift  Outcome: Progressing     Problem: Discharge Planning  Goal: Discharge to home or other facility with appropriate resources  Outcome: Progressing     Problem: Chronic Conditions and Co-morbidities  Goal: Patient's chronic conditions and co-morbidity symptoms are monitored and maintained or improved  Outcome: Progressing   The patient's goals for the shift include pain control    The clinical goals for the shift include pain management, out of bed, increase mobility, safety, rest      05/10/24 at 5:33 AM - Danielle Zamudio RN

## 2024-05-10 NOTE — PROGRESS NOTES
Domenica Estrada is a 78 y.o. female on day 0 of admission presenting with Acute bilateral knee pain.      Subjective   Patient resting in chair waiting for discharge, Denies any numbness to left knee has minimal pain.        Objective     Last Recorded Vitals  /80 (BP Location: Left arm, Patient Position: Sitting)   Pulse 87   Temp 36.6 °C (97.9 °F) (Temporal)   Resp 16   Wt 59.8 kg (131 lb 13.4 oz)   SpO2 98%   Intake/Output last 3 Shifts:    Intake/Output Summary (Last 24 hours) at 5/10/2024 1135  Last data filed at 5/10/2024 0909  Gross per 24 hour   Intake 2319.17 ml   Output 1300 ml   Net 1019.17 ml       Admission Weight  Weight: 59.9 kg (132 lb) (05/09/24 1000)    Daily Weight  05/09/24 : 59.8 kg (131 lb 13.4 oz)    Image Results  XR knee left 1-2 views  Narrative: Interpreted By:  Bo Eduardo,   STUDY:  XR KNEE LEFT 1-2 VIEWS; ;  5/9/2024 3:25 pm      INDICATION:  Signs/Symptoms:Post-op knee.      COMPARISON:  02/14/2024.      ACCESSION NUMBER(S):  [id number]      ORDERING CLINICIAN:  JUAN ARMENDARIZ      FINDINGS:  Please see the impression      Impression: Interval left total knee arthroplasty.      Expected postoperative soft tissue air          MACRO:  None      Signed by: Bo Eduardo 5/9/2024 3:53 PM  Dictation workstation:   [id number]      Physical Exam  Constitutional:       Appearance: Normal appearance.   Cardiovascular:      Rate and Rhythm: Normal rate and regular rhythm.      Pulses: Normal pulses.      Heart sounds: Normal heart sounds.   Pulmonary:      Effort: Pulmonary effort is normal.      Breath sounds: Normal breath sounds.   Abdominal:      General: Bowel sounds are normal.      Palpations: Abdomen is soft.   Musculoskeletal:         General: Normal range of motion.   Skin:     General: Skin is warm and dry.      Comments: Left knee with surgical dressing dry and intact   Neurological:      Mental Status: She is alert and oriented to person, place, and time.          Relevant Results             Results for orders placed or performed during the hospital encounter of 05/09/24 (from the past 24 hour(s))   CBC   Result Value Ref Range    WBC 5.8 4.4 - 11.3 x10*3/uL    nRBC 0.0 0.0 - 0.0 /100 WBCs    RBC 4.55 4.00 - 5.20 x10*6/uL    Hemoglobin 12.5 12.0 - 16.0 g/dL    Hematocrit 39.1 36.0 - 46.0 %    MCV 86 80 - 100 fL    MCH 27.5 26.0 - 34.0 pg    MCHC 32.0 32.0 - 36.0 g/dL    RDW 13.6 11.5 - 14.5 %    Platelets 129 (L) 150 - 450 x10*3/uL   Basic metabolic panel   Result Value Ref Range    Glucose 113 (H) 65 - 99 mg/dL    Sodium 140 133 - 145 mmol/L    Potassium 4.5 3.4 - 5.1 mmol/L    Chloride 106 97 - 107 mmol/L    Bicarbonate 27 24 - 31 mmol/L    Urea Nitrogen 10 8 - 25 mg/dL    Creatinine 0.90 0.40 - 1.60 mg/dL    eGFR 65 >60 mL/min/1.73m*2    Calcium 8.7 8.5 - 10.4 mg/dL    Anion Gap 7 <=19 mmol/L       Assessment/Plan                  Principal Problem:    Acute bilateral knee pain  Active Problems:    S/P total knee arthroplasty, left    Osteoarthritis with left total knee  Pain medication per surgery  PT/OT in am  DVT prophylaxis  Mechanical compression  Medication per surgery, Aspirin 81 mg BID  Plan of Care  Home medication reviewed  Advanced directives discussed with with patient.  She does not have a living will.  She wishes to be a DNR DNI.   Patient plans to go home at discharge with her spouse and home health care.  Plan of care discussed with patient, spouse and family at bedside and collaborating physician Dr. Murillo.              Cinthya Gonzalez, APRN-CNP

## 2024-05-10 NOTE — DISCHARGE SUMMARY
Discharge Diagnosis  Acute bilateral knee pain    Issues Requiring Follow-Up  [facility]    Test Results Pending At Discharge  Pending Labs       Order Current Status    Surgical Pathology Exam In process            Hospital Course   Patient was admitted s/p Procedure(s) (LRB):  Knee Replacement Total Cement Unilat (Left). Appropriate antibiotics, dvt prophylaxis, and supportive care given. Cleared by PT and medicine for d/c      Pertinent Physical Exam At Time of Discharge  Physical Exam    Home Medications     Medication List      START taking these medications     aspirin 81 mg chewable tablet; Chew and swallow 1 tablet (81 mg) every   12 hours.   cefadroxil 500 mg capsule; Commonly known as: Duricef; Take 1 capsule   (500 mg) by mouth 2 times a day.   docusate sodium 100 mg capsule; Commonly known as: Colace; Take 1   capsule (100 mg) by mouth if needed for constipation.   naproxen 500 mg tablet; Commonly known as: Naprosyn; Take 1 tablet (500   mg) by mouth 2 times a day.   oxyCODONE-acetaminophen 5-325 mg tablet; Commonly known as: Percocet;   Take 1 tablet by mouth every 4 hours if needed for severe pain (7 - 10).     CONTINUE taking these medications     ciprofloxacin 500 mg tablet; Commonly known as: Cipro; Take 1 tablet   (500 mg) by mouth 2 times a day for 5 days.   hydrOXYzine HCL 25 mg tablet; Commonly known as: Atarax; 1 TABLET AS   NEEDED DAILY FOR ANXIETY   MAG GLYCINATE ORAL   NON FORMULARY     STOP taking these medications     chlorhexidine 0.12 % solution; Commonly known as: Peridex       Outpatient Follow-Up  Future Appointments   Date Time Provider Department Center   5/24/2024 11:30 AM Sammy Wise MD [id number]       Sammy Wise MD

## 2024-05-10 NOTE — CARE PLAN
Problem: Balance  Goal: standing balance  Description: Pt will improve standing balance with 2ww to Mod Ind for reduced falls risk.    5/10/2024 0959 by SUSAN YoonPT  Outcome: Progressing  5/10/2024 0956 by SUSAN YoonPT  Outcome: Progressing     Problem: Mobility  Goal: Bed mobility  Description: Pt will be ind with all bed mobility with HOB flat.    5/10/2024 0959 by SUSAN YoonPT  Outcome: Progressing  5/10/2024 0956 by SUSAN YoonPT  Outcome: Progressing  Goal: Gait  Description: Pt will safely ambulate with 2ww at Mod Ind >200 feet.  5/10/2024 0959 by SUSAN YoonPT  Outcome: Progressing  5/10/2024 0956 by SUSAN YoonPT  Outcome: Progressing  Goal: Stair  Description: Pt will safely negotiate a flight of stairs at supervision assist with handrail for UE support.   5/10/2024 0959 by SUSAN YoonPT  Outcome: Progressing  5/10/2024 0956 by SUSAN YoonPT  Outcome: Progressing     Problem: Safety  Goal: LTG - Patient will demonstrate safety requirements appropriate to situation/environment  5/10/2024 0959 by SUSAN YoonPT  Outcome: Progressing  5/10/2024 0956 by SUSAN YoonPT  Outcome: Progressing     Problem: PT Transfers  Goal: Transfers  Description: Pt will safely transfer at Mod Ind with 2ww demonstrating improved safety awareness.   5/10/2024 0959 by SUSAN YoonPT  Outcome: Progressing  5/10/2024 0956 by SUSAN YoonPT  Outcome: Progressing     Problem: Mobility  Goal: ROM  Description: Pt will achieve 0-90 degrees of L knee ROM.   Outcome: Progressing

## 2024-05-10 NOTE — HH CARE COORDINATION
Home Care received a Referral for Physical Therapy. We have processed the referral for a Start of Care on 5/11/24.     If you have any questions or concerns, please feel free to contact us at [phone]. Follow the prompts, enter your five digit zip code, and you will be directed to your care team on [facility].

## 2024-05-10 NOTE — PROGRESS NOTES
Physical Therapy    Physical Therapy Evaluation    Patient Name: Domenica Estrada  MRN: [id number]  Today's Date: 5/10/2024   Time Calculation  Start Time: 0726  Stop Time: 0820  Time Calculation (min): 54 min  Documentation and services provided by student physical therapist while supervised under a licensed physical therapist.   GAVIN Shook      Assessment/Plan   PT Assessment  PT Assessment Results: Decreased strength, Decreased endurance, Decreased range of motion, Impaired balance, Decreased mobility, Impaired judgement, Decreased safety awareness, Orthopedic restrictions, Pain  Rehab Prognosis: Good  Medical Staff Made Aware: Yes  End of Session Communication: Bedside nurse  End of Session Patient Position: Alarm on, Up in chair    Assessment Comment: 79 y/o female presenting post surgical for L TKA deomonstrating reduced mobility, strength, and ROM secondary to surgery. Pt will benefit from both acute PT services while in house and post acute services. Anticipated improvements in functional mobility, strength and L knee ROM with skilled services.    Treatement:  TE: Pt education on L knee HEP to include ankle pumps, heel slides, and quad sets. Education on the important of regularly performing ROM exercises to aid in return of mobility and assist with blood circulation. Following education pt performed x 10 reps for ea exercise and demonstrated understanding of HEP.  Gait: gait training with use of 3ww and 2ww with pt observed to have improved safety awareness and stability with use of 2ww. Improved posture also noted with 2ww over 3ww and was issued 2ww by therapy. Pt required education during ambulation to stay close to walker, improve heel strike, reduce flexed knees, and addition cues for walker management for increased safety. Stair training 2 sets x 4 stairs with use of x 1 HR and x 2 HR to mimic home going environment. Pt required demonstrate of stair negotiating first and CGA for safety but  "demonstrated no LOB and proper sequencing with education.     IP OR SWING BED PT PLAN  Inpatient or Swing Bed: Inpatient  PT Plan  Treatment/Interventions: Bed mobility, Transfer training, Stair training, Gait training, Neuromuscular re-education, Balance training, Endurance training, Range of motion, Strengthening, Therapeutic exercise, Therapeutic activity, Home exercise program  PT Plan: Skilled PT  PT Frequency: BID  PT Discharge Recommendations: Low intensity level of continued care  Equipment Recommended upon Discharge: Wheeled walker (administered)  PT Recommended Transfer Status: Assist x1, Assistive device  PT - OK to Discharge: Yes      Subjective   General Visit Information:  General  Reason for Referral: Post surgical L knee TKA  Referred By: Dr Boyd  Past Medical History Relevant to Rehab: PMH to include but not limited to anxiety, GERD, HLD, OA, osteopenia.  Missed Visit: No  Family/Caregiver Present: No  Prior to Session Communication: Bedside nurse  Patient Position Received: Bed, 3 rail up, Alarm on  Preferred Learning Style: verbal, visual  General Comment: Pt presents sitting up in bed pleasant and agreeable to participate in therapy. Denies lightheadedness or SOB but reports nausea and dry heaves but denies vomitting.  Home Living:  Home Living  Type of Home: House  Lives With: Spouse ()  Home Adaptive Equipment: Other (Comment) (3 ww w/ basket)  Home Layout: Other (Comment) (first floor set-up once in house.)  Home Access: Stairs to enter with rails, Ramped entrance (x 3 steps to enter with HRs B/L.)  Entrance Stairs-Rails: Both  Entrance Stairs-Number of Steps: 3  Bathroom Shower/Tub: Walk-in shower  Bathroom Toilet: Other (Comment) (raised)  Bathroom Equipment: Grab bars in shower, Built-in shower seat (\"grab bars everywhere\")  Home Living Comments: Pt lives with  and has first floor set-up once in the house. Pt has mult entries into house with ramp availability if needed but " prefers to use garage enterance x 3 steps with HR.  Prior Level of Function:  Prior Function Per Pt/Caregiver Report  Level of [place]: Independent with ADLs and functional transfers, Independent with homemaking with ambulation  Receives Help From: Family ( and wife slipt household activities)  ADL Assistance: Independent  Homemaking Assistance: Independent  Ambulatory Assistance: Independent (with Assistive device)  Prior Function Comments: Pt was not shopping and had  assist with IADLs. Pt reports being Ind with ADLs.  Precautions:  Precautions  LE Weight Bearing Status: Weight Bearing as Tolerated  Medical Precautions: Fall precautions  Post-Surgical Precautions: Left total knee precautions  Vital Signs:       Objective   Pain:  Pain Assessment  Pain Assessment: 0-10  Pain Score: 2  Pain Type: Surgical pain  Pain Location: Knee  Pain Orientation: Left  Cognition:  Cognition  Overall Cognitive Status: Within Functional Limits  Orientation Level: Oriented X4, Other (Comment) (Pt demonstrates reduced safety awareness with transfering in bathroom requiring cues to sit back down as pt stood without 2ww or A.D in front of pt. PT with pt encouraging her to sit back down for safety mult times and educated her on safety concerns.)  Insight: Mild  Impulsive: Mildly    General Assessments:  Activity Tolerance  Endurance: Endurance does not limit participation in activity  Activity Tolerance Comments: Pts activity tolerance was not hindered this session.    Sensation  Light Touch: No apparent deficits    Strength  Strength Comments: >3/5 LE strength observed  Coordination  Movements are Fluid and Coordinated: Yes    Postural Control  Postural Control: Within Functional Limits  Posture Comment: good posture withmin cues to stand upright. Pt had reduced insight on standng upright but following pt education improved.    Static Sitting Balance  Static Sitting-Balance Support: Feet supported, No upper extremity  supported  Static Sitting-Level of Assistance: Modified independent  Static Sitting-Comment/Number of Minutes: sitting in bed and EOB  Dynamic Sitting Balance  Dynamic Sitting-Balance Support: Feet supported, Bilateral upper extremity supported  Dynamic Sitting-Comments: UE assist for scooting to EOB. Able to don shoes without UE support.    Static Standing Balance  Static Standing-Balance Support: Bilateral upper extremity supported  Static Standing-Level of Assistance: Close supervision  Static Standing-Comment/Number of Minutes: standing at bedside and in bathroom with close SUP and CGA at times  Dynamic Standing Balance  Dynamic Standing-Balance Support: Bilateral upper extremity supported  Dynamic Standing-Comments: CGA and SBA for turning with walker.  Functional Assessments:  Bed Mobility  Bed Mobility: Yes  Bed Mobility 1  Bed Mobility 1: Supine to sitting  Level of Assistance 1: Modified independent  Bed Mobility Comments 1: assist of HOB elevated and handrails as needed.    Transfers  Transfer: Yes  Transfer 1  Transfer From 1: Sit to  Transfer to 1: Stand  Technique 1: Sit to stand  Transfer Device 1: Walker  Transfer Level of Assistance 1: Close supervision  Trials/Comments 1: mult reps throughout session from bed, w/c and toilet  Transfers 2  Transfer From 2: Stand to  Transfer to 2: Sit  Technique 2: Stand to sit  Transfer Device 2: Walker  Transfer Level of Assistance 2: Close supervision  Trials/Comments 2: mult reps during session to bedside chair, toilet and w/c  Transfers 3  Transfer From 3: Bed to, Toilet to  Transfer to 3: Toilet, Stand  Technique 3: Sit to stand, Stand to sit  Transfer Device 3: Walker  Transfer Level of Assistance 3: Close supervision, Moderate verbal cues  Trials/Comments 3: mod verbal cues for safety with pt transfering requiring education and safety training to use 2ww for support    Ambulation/Gait Training  Ambulation/Gait Training Performed: Yes  Ambulation/Gait Training  1  Surface 1: Level tile  Device 1: Rolling walker  Assistance 1: Contact guard, Close supervision  Quality of Gait 1: Diminished heel strike, Soft knee(s)  Comments/Distance (ft) 1: mult walks up 3 x 20 feet and 2-3 walks >150 feet.    Stairs  Stairs: Yes  Stairs  Rails 1: Bilateral  Device 1: Railing  Support Devices 1: Gait belt  Assistance 1: Contact guard  Comment/Number of Steps 1: negotiated x 4 steps with step too pattern x 2 sets. First set with B/L handrails and second set with HR on R.  Extremity/Trunk Assessments:  RLE   RLE : Within Functional Limits  LLE   LLE : Within Functional Limits  AROM LLE (degrees)  LLE AROM Comment: observed AROM to LLE with heel slides achieving -5 to 60 degrees ROM.  Outcome Measures:  [facility] Basic Mobility  Turning from your back to your side while in a flat bed without using bedrails: None  Moving from lying on your back to sitting on the side of a flat bed without using bedrails: A little  Moving to and from bed to chair (including a wheelchair): A little  Standing up from a chair using your arms (e.g. wheelchair or bedside chair): None  To walk in hospital room: A little  Climbing 3-5 steps with railing: A little  Basic Mobility - Total Score: 20    Encounter Problems       Encounter Problems (Active)       Balance       standing balance (Progressing)       Start:  05/10/24    Expected End:  05/24/24       Pt will improve standing balance with 2ww to Mod Ind for reduced falls risk.              Mobility       Bed mobility (Progressing)       Start:  05/10/24    Expected End:  05/24/24       Pt will be ind with all bed mobility with HOB flat.           Gait (Progressing)       Start:  05/10/24    Expected End:  05/24/24       Pt will safely ambulate with 2ww at Mod Ind >200 feet.         Stair (Progressing)       Start:  05/10/24    Expected End:  05/24/24       Pt will safely negotiate a flight of stairs at supervision assist with handrail for UE support.              Mobility       ROM (Progressing)       Start:  05/10/24    Expected End:  05/24/24       Pt will achieve 0-90 degrees of L knee ROM.             PT Transfers       Transfers (Progressing)       Start:  05/10/24    Expected End:  05/24/24       Pt will safely transfer at Mod Ind with 2ww demonstrating improved safety awareness.             Safety       LTG - Patient will demonstrate safety requirements appropriate to situation/environment (Progressing)       Start:  05/10/24    Expected End:  05/24/24                   Education Documentation  Precautions, taught by BLAINE Yoon at 5/10/2024  9:57 AM.  Learner: Patient  Readiness: Acceptance  Method: Explanation  Response: Verbalizes Understanding, Demonstrated Understanding    Body Mechanics, taught by BLAINE Yoon at 5/10/2024  9:57 AM.  Learner: Patient  Readiness: Acceptance  Method: Explanation  Response: Verbalizes Understanding, Demonstrated Understanding    Mobility Training, taught by BLAINE Yoon at 5/10/2024  9:57 AM.  Learner: Patient  Readiness: Acceptance  Method: Explanation  Response: Verbalizes Understanding, Demonstrated Understanding    Education Comments  No comments found.

## 2024-05-10 NOTE — CARE PLAN
Problem: Balance  Goal: standing balance  Description: Pt will improve standing balance with 2ww to Mod Ind for reduced falls risk.    Outcome: Progressing     Problem: Mobility  Goal: Bed mobility  Description: Pt will be ind with all bed mobility with HOB flat.    Outcome: Progressing  Goal: Gait  Description: Pt will safely ambulate with 2ww at Mod Ind >200 feet.  Outcome: Progressing  Goal: Stair  Description: Pt will safely negotiate a flight of stairs at supervision assist with handrail for UE support.   Outcome: Progressing     Problem: Safety  Goal: LTG - Patient will demonstrate safety requirements appropriate to situation/environment  Outcome: Progressing     Problem: PT Transfers  Goal: Transfers  Description: Pt will safely transfer at Mod Ind with 2ww demonstrating improved safety awareness.   Outcome: Progressing

## 2024-05-10 NOTE — PROGRESS NOTES
78 y.o. female s/p Procedure(s) (LRB):  Knee Replacement Total Cement Unilat (Left)    Patient seen and examined  Pain controlled  No acute events      Physical exam  LLE  5/5 df/pf ankle/great toe  Adah s/s/sp/dp/t  2+ dp pulse    Post op XR reviewed, expected post operative changes    78 y.o. female s/p Procedure(s) (LRB):  Knee Replacement Total Cement Unilat (Left)  - WBAT LLE  - dvt ppx  - ancef x 24h  - discharge today after PT

## 2024-05-11 ENCOUNTER — HOME CARE VISIT (OUTPATIENT)
Dept: HOME HEALTH SERVICES | Facility: HOME HEALTH | Age: 79
End: 2024-05-11
Payer: MEDICARE

## 2024-05-11 VITALS
RESPIRATION RATE: 18 BRPM | DIASTOLIC BLOOD PRESSURE: 72 MMHG | SYSTOLIC BLOOD PRESSURE: 126 MMHG | OXYGEN SATURATION: 98 % | HEART RATE: 89 BPM | TEMPERATURE: 98 F

## 2024-05-11 PROCEDURE — 1090000002 HH PPS REVENUE DEBIT

## 2024-05-11 PROCEDURE — 0023 HH SOC

## 2024-05-11 PROCEDURE — G0151 HHCP-SERV OF PT,EA 15 MIN: HCPCS | Mod: HHH

## 2024-05-11 PROCEDURE — 1090000001 HH PPS REVENUE CREDIT

## 2024-05-11 PROCEDURE — 169592 NO-PAY CLAIM PROCEDURE

## 2024-05-11 SDOH — HEALTH STABILITY: PHYSICAL HEALTH: EXERCISE ACTIVITIES SETS: 1

## 2024-05-11 SDOH — HEALTH STABILITY: PHYSICAL HEALTH: EXERCISE ACTIVITY: APS, GLUTE/QUAD SETS, HEEL SLIDE, SLR, SAQ, HIP ABD

## 2024-05-11 SDOH — HEALTH STABILITY: PHYSICAL HEALTH

## 2024-05-11 SDOH — HEALTH STABILITY: PHYSICAL HEALTH: PHYSICAL EXERCISE: SUPINE

## 2024-05-11 SDOH — HEALTH STABILITY: PHYSICAL HEALTH: PHYSICAL EXERCISE: 10

## 2024-05-11 ASSESSMENT — ACTIVITIES OF DAILY LIVING (ADL)
AMBULATION_DISTANCE/DURATION_TOLERATED: 50 FEET
AMBULATION ASSISTANCE: ONE PERSON
CURRENT_FUNCTION: CONTACT GUARD ASSIST
ENTERING_EXITING_HOME: CONTACT GUARD ASSIST
AMBULATION ASSISTANCE ON FLAT SURFACES: 1
AMBULATION ASSISTANCE: 1
PHYSICAL TRANSFERS ASSESSED: 1
OASIS_M1830: 03
CURRENT_FUNCTION: ONE PERSON
AMBULATION ASSISTANCE: CONTACT GUARD ASSIST

## 2024-05-11 ASSESSMENT — ENCOUNTER SYMPTOMS
LOWEST PAIN SEVERITY IN PAST 24 HOURS: 2/10
HIGHEST PAIN SEVERITY IN PAST 24 HOURS: 8/10
OCCASIONAL FEELINGS OF UNSTEADINESS: 1
PAIN LOCATION: LEFT KNEE
PERSON REPORTING PAIN: PATIENT
SUBJECTIVE PAIN PROGRESSION: GRADUALLY IMPROVING
PAIN: 1
LIMITED RANGE OF MOTION: 1
PAIN LOCATION - PAIN SEVERITY: 8/10
MUSCLE WEAKNESS: 1
PAIN LOCATION - RELIEVING FACTORS: REST, ICE, MEDS
PAIN LOCATION - EXACERBATING FACTORS: MOVEMENT
PAIN LOCATION - PAIN QUALITY: ACHING

## 2024-05-12 ENCOUNTER — HOME CARE VISIT (OUTPATIENT)
Dept: HOME HEALTH SERVICES | Facility: HOME HEALTH | Age: 79
End: 2024-05-12
Payer: MEDICARE

## 2024-05-12 PROCEDURE — 1090000002 HH PPS REVENUE DEBIT

## 2024-05-12 PROCEDURE — 1090000001 HH PPS REVENUE CREDIT

## 2024-05-13 PROCEDURE — 1090000001 HH PPS REVENUE CREDIT

## 2024-05-13 PROCEDURE — 1090000002 HH PPS REVENUE DEBIT

## 2024-05-14 ENCOUNTER — HOME CARE VISIT (OUTPATIENT)
Dept: HOME HEALTH SERVICES | Facility: HOME HEALTH | Age: 79
End: 2024-05-14
Payer: MEDICARE

## 2024-05-14 VITALS
SYSTOLIC BLOOD PRESSURE: 140 MMHG | OXYGEN SATURATION: 100 % | RESPIRATION RATE: 16 BRPM | HEART RATE: 77 BPM | DIASTOLIC BLOOD PRESSURE: 78 MMHG | TEMPERATURE: 98.1 F

## 2024-05-14 PROCEDURE — G0157 HHC PT ASSISTANT EA 15: HCPCS | Mod: CQ,HHH

## 2024-05-14 PROCEDURE — 1090000001 HH PPS REVENUE CREDIT

## 2024-05-14 PROCEDURE — 1090000002 HH PPS REVENUE DEBIT

## 2024-05-14 ASSESSMENT — ENCOUNTER SYMPTOMS
PERSON REPORTING PAIN: PATIENT
PAIN LOCATION - PAIN FREQUENCY: FREQUENT
PAIN LOCATION: LEFT KNEE
PAIN SEVERITY GOAL: 0/10
HIGHEST PAIN SEVERITY IN PAST 24 HOURS: 7/10
PAIN LOCATION - EXACERBATING FACTORS: ACTIVITY
PAIN: 1
LOWEST PAIN SEVERITY IN PAST 24 HOURS: 3/10
PAIN LOCATION - PAIN DURATION: SINCE SURGERY
PAIN LOCATION - PAIN QUALITY: ACHING, THROBBING
PAIN LOCATION - PAIN SEVERITY: 4/10

## 2024-05-15 PROCEDURE — 1090000002 HH PPS REVENUE DEBIT

## 2024-05-15 PROCEDURE — 1090000001 HH PPS REVENUE CREDIT

## 2024-05-16 PROCEDURE — 1090000002 HH PPS REVENUE DEBIT

## 2024-05-16 PROCEDURE — 1090000001 HH PPS REVENUE CREDIT

## 2024-05-17 ENCOUNTER — HOME CARE VISIT (OUTPATIENT)
Dept: HOME HEALTH SERVICES | Facility: HOME HEALTH | Age: 79
End: 2024-05-17
Payer: MEDICARE

## 2024-05-17 VITALS
OXYGEN SATURATION: 96 % | SYSTOLIC BLOOD PRESSURE: 132 MMHG | HEART RATE: 74 BPM | RESPIRATION RATE: 16 BRPM | DIASTOLIC BLOOD PRESSURE: 72 MMHG | TEMPERATURE: 98 F

## 2024-05-17 PROCEDURE — 1090000001 HH PPS REVENUE CREDIT

## 2024-05-17 PROCEDURE — G0157 HHC PT ASSISTANT EA 15: HCPCS | Mod: CQ,HHH

## 2024-05-17 PROCEDURE — 1090000002 HH PPS REVENUE DEBIT

## 2024-05-17 ASSESSMENT — ENCOUNTER SYMPTOMS
PAIN: 1
PAIN SEVERITY GOAL: 0/10
PAIN LOCATION - PAIN QUALITY: ACHING
PAIN LOCATION - PAIN FREQUENCY: FREQUENT
PAIN LOCATION - RELIEVING FACTORS: MEDICATION, ICE
SUBJECTIVE PAIN PROGRESSION: GRADUALLY IMPROVING
PAIN LOCATION: LEFT KNEE
LOWEST PAIN SEVERITY IN PAST 24 HOURS: 2/10
PERSON REPORTING PAIN: PATIENT
PAIN LOCATION - EXACERBATING FACTORS: ACTIVITY, TIME OF DAY
PAIN LOCATION - PAIN SEVERITY: 2/10
HIGHEST PAIN SEVERITY IN PAST 24 HOURS: 5/10
PAIN LOCATION - PAIN DURATION: 1 WEEK

## 2024-05-18 PROCEDURE — 1090000001 HH PPS REVENUE CREDIT

## 2024-05-18 PROCEDURE — 1090000002 HH PPS REVENUE DEBIT

## 2024-05-19 PROCEDURE — 1090000002 HH PPS REVENUE DEBIT

## 2024-05-19 PROCEDURE — 1090000001 HH PPS REVENUE CREDIT

## 2024-05-20 ENCOUNTER — HOME CARE VISIT (OUTPATIENT)
Dept: HOME HEALTH SERVICES | Facility: HOME HEALTH | Age: 79
End: 2024-05-20
Payer: MEDICARE

## 2024-05-20 VITALS
TEMPERATURE: 98.6 F | RESPIRATION RATE: 16 BRPM | DIASTOLIC BLOOD PRESSURE: 68 MMHG | HEART RATE: 90 BPM | SYSTOLIC BLOOD PRESSURE: 120 MMHG | OXYGEN SATURATION: 99 %

## 2024-05-20 PROCEDURE — G0157 HHC PT ASSISTANT EA 15: HCPCS | Mod: CQ,HHH

## 2024-05-20 PROCEDURE — 1090000001 HH PPS REVENUE CREDIT

## 2024-05-20 PROCEDURE — 1090000002 HH PPS REVENUE DEBIT

## 2024-05-20 ASSESSMENT — ENCOUNTER SYMPTOMS
PERSON REPORTING PAIN: PATIENT
PAIN LOCATION - PAIN FREQUENCY: FREQUENT
PAIN LOCATION - EXACERBATING FACTORS: TIME OF DAY, ACTIVITY
PAIN: 1
PAIN LOCATION - EXACERBATING FACTORS: ACTIVITY, EXERCISE
PAIN LOCATION - PAIN SEVERITY: 5/10
PAIN LOCATION: LEFT KNEE
PAIN LOCATION - PAIN SEVERITY: 0/10
PAIN LOCATION - PAIN QUALITY: ACHING, THROBBING
PAIN LOCATION: RIGHT KNEE
PAIN LOCATION - RELIEVING FACTORS: MEDICATION
PAIN LOCATION - PAIN DURATION: CHRONIC
PAIN LOCATION - PAIN FREQUENCY: INFREQUENT

## 2024-05-20 ASSESSMENT — ACTIVITIES OF DAILY LIVING (ADL): AMBULATION ASSISTANCE ON FLAT SURFACES: 1

## 2024-05-21 LAB
LABORATORY COMMENT REPORT: NORMAL
PATH REPORT.FINAL DX SPEC: NORMAL
PATH REPORT.GROSS SPEC: NORMAL
PATH REPORT.RELEVANT HX SPEC: NORMAL
PATH REPORT.TOTAL CANCER: NORMAL

## 2024-05-21 PROCEDURE — 1090000001 HH PPS REVENUE CREDIT

## 2024-05-21 PROCEDURE — 1090000002 HH PPS REVENUE DEBIT

## 2024-05-21 NOTE — CASE COMMUNICATION
Patient ready for homecare agency discharge this week. Sees Dr Wise for post op follow up on 5/24/24. Will be starting outpatient PT at [facility] on 5/28/24.   [employer] form reviewed and signed electronically by patient today with end date of homecare coverage 5/24/24.

## 2024-05-22 PROCEDURE — 1090000002 HH PPS REVENUE DEBIT

## 2024-05-22 PROCEDURE — 1090000001 HH PPS REVENUE CREDIT

## 2024-05-23 ENCOUNTER — HOME CARE VISIT (OUTPATIENT)
Dept: HOME HEALTH SERVICES | Facility: HOME HEALTH | Age: 79
End: 2024-05-23
Payer: MEDICARE

## 2024-05-23 PROCEDURE — G0151 HHCP-SERV OF PT,EA 15 MIN: HCPCS | Mod: HHH

## 2024-05-23 PROCEDURE — 1090000002 HH PPS REVENUE DEBIT

## 2024-05-23 PROCEDURE — 1090000001 HH PPS REVENUE CREDIT

## 2024-05-23 ASSESSMENT — ACTIVITIES OF DAILY LIVING (ADL)
HOME_HEALTH_OASIS: 00
OASIS_M1830: 00

## 2024-05-24 ENCOUNTER — OFFICE VISIT (OUTPATIENT)
Dept: ORTHOPEDIC SURGERY | Facility: CLINIC | Age: 79
End: 2024-05-24
Payer: MEDICARE

## 2024-05-24 ENCOUNTER — HOSPITAL ENCOUNTER (OUTPATIENT)
Dept: RADIOLOGY | Facility: HOSPITAL | Age: 79
Discharge: HOME | End: 2024-05-24
Payer: MEDICARE

## 2024-05-24 DIAGNOSIS — Z96.659 HISTORY OF REVISION OF TOTAL KNEE ARTHROPLASTY: ICD-10-CM

## 2024-05-24 DIAGNOSIS — M17.11 ARTHRITIS OF RIGHT KNEE: ICD-10-CM

## 2024-05-24 DIAGNOSIS — Z96.659 HISTORY OF REVISION OF TOTAL KNEE ARTHROPLASTY: Primary | ICD-10-CM

## 2024-05-24 PROCEDURE — 1159F MED LIST DOCD IN RCRD: CPT | Performed by: ORTHOPAEDIC SURGERY

## 2024-05-24 PROCEDURE — 99024 POSTOP FOLLOW-UP VISIT: CPT | Performed by: ORTHOPAEDIC SURGERY

## 2024-05-24 PROCEDURE — 1160F RVW MEDS BY RX/DR IN RCRD: CPT | Performed by: ORTHOPAEDIC SURGERY

## 2024-05-24 PROCEDURE — 73564 X-RAY EXAM KNEE 4 OR MORE: CPT | Mod: LT

## 2024-05-24 PROCEDURE — 73564 X-RAY EXAM KNEE 4 OR MORE: CPT | Mod: LEFT SIDE | Performed by: RADIOLOGY

## 2024-05-24 NOTE — PROGRESS NOTES
No chief complaint on file.        This is a 78 y.o. female who is 2 weeks out from left total knee.  Pain is under control with current medications.  No drainage from her incision no fevers or chills.  Progressing well with physical therapy and appropriately improving in function.  No other new issues or symptoms.    Left knee examination: Surgical incision well approximated no erythema no drainage.  Stable to varus valgus stress range of motion 0-1 10.  Neurovascular tact distally    X-rays of the knee were reviewed independently interpreted by me today, the show stable total knee arthroplasty no fracture dislocation or loosening    Impression plan: 78 y.o. female 2 weeks out from left total knee.  We discussed continuing physical therapy and home exercise program. Pain medications to be used as needed. Assistive devices as needed per PT. We discussed DVT prophylaxis until 6 weeks. No dentist until 3 months and thereafter dental prophylaxis for life. Activity as tolerated weightbearing as tolerated. I have personally reviewed the OARRS report for the patient. This report is scanned into the electronic medical record. I have considered the risks of abuse, dependence, addiction and diversion. Follow up 4 weeks with xrays.

## 2024-05-28 ENCOUNTER — EVALUATION (OUTPATIENT)
Dept: PHYSICAL THERAPY | Facility: HOSPITAL | Age: 79
End: 2024-05-28
Payer: MEDICARE

## 2024-05-28 DIAGNOSIS — Z96.659 HISTORY OF REVISION OF TOTAL KNEE ARTHROPLASTY: ICD-10-CM

## 2024-05-28 DIAGNOSIS — M25.562 LEFT KNEE PAIN, UNSPECIFIED CHRONICITY: Primary | ICD-10-CM

## 2024-05-28 PROCEDURE — 97161 PT EVAL LOW COMPLEX 20 MIN: CPT | Mod: GP | Performed by: PHYSICAL THERAPIST

## 2024-05-28 PROCEDURE — G0180 MD CERTIFICATION HHA PATIENT: HCPCS | Performed by: ORTHOPAEDIC SURGERY

## 2024-05-28 PROCEDURE — 97110 THERAPEUTIC EXERCISES: CPT | Mod: GP | Performed by: PHYSICAL THERAPIST

## 2024-05-28 ASSESSMENT — PATIENT HEALTH QUESTIONNAIRE - PHQ9
SUM OF ALL RESPONSES TO PHQ9 QUESTIONS 1 AND 2: 0
1. LITTLE INTEREST OR PLEASURE IN DOING THINGS: NOT AT ALL
2. FEELING DOWN, DEPRESSED OR HOPELESS: NOT AT ALL

## 2024-05-28 ASSESSMENT — PAIN - FUNCTIONAL ASSESSMENT: PAIN_FUNCTIONAL_ASSESSMENT: 0-10

## 2024-05-28 ASSESSMENT — PAIN SCALES - GENERAL: PAINLEVEL_OUTOF10: 0 - NO PAIN

## 2024-05-28 ASSESSMENT — ENCOUNTER SYMPTOMS
OCCASIONAL FEELINGS OF UNSTEADINESS: 0
DEPRESSION: 0
LOSS OF SENSATION IN FEET: 0

## 2024-05-28 NOTE — PROGRESS NOTES
Physical Therapy  Physical Therapy Orthopedic Evaluation    Patient Name: Domenica Estrada  MRN: [id number]  Today's Date: 5/28/2024  Time Calculation  Start Time: 1039  Stop Time: 1121  Time Calculation (min): 42 min    Today's Charges  PT Evaluation Time Entry  PT Evaluation (Low) Time Entry: 24  PT Therapeutic Procedures Time Entry  Therapeutic Exercise Time Entry: 18      Insurance:  Visit number: 1 of [place]  Authorization info: No auth required  Payor: MEDICARE / Plan: MEDICARE PART A AND B / Product Type: *No Product type* /     Current Problem  Problem List Items Addressed This Visit             ICD-10-CM    Left knee pain - Primary M25.562    Relevant Orders    Follow Up In Physical Therapy     Other Visit Diagnoses         Codes    History of revision of total knee arthroplasty     Z96.659          1. Left knee pain, unspecified chronicity  Follow Up In Physical Therapy      2. History of revision of total knee arthroplasty  Referral to Physical Therapy          General:  General  Reason for Referral: L knee pain  Referred By: Dr. Wise  Past Medical History Relevant to Rehab: R knee pain due to OA  Preferred Learning Style: verbal, visual, written      Precautions:   Precautions  STEADI Fall Risk Score (The score of 4 or more indicates an increased risk of falling): 4  LE Weight Bearing Status: Weight Bearing as Tolerated  Medical Precautions: Fall precautions  Precautions Comment: L TKR protocol- 05/09/2024 DOS    Medical History Form: Reviewed (scanned into chart)    Subjective:   Subjective   Chief Complaint: Patient presents to clinic s/p L TKR due to OA. She is hoping to get the R knee done as well this year due to pain from OA as well.  Onset Date: DOS 05/09/2024  BIBI: Chronic    Current Condition:   Better    Pain:  Pain Assessment: 0-10  Pain Score: 0 - No pain  Highest: 2/10 pain  Lowest: 0/10 pain  Location: L anterior knee  Description: stiff/tight/achy  Aggravating Factors:  Pain at  night  Relieving Factors:  Laying on her back    Relevant Information (PMH & Previous Tests/Imaging): x-rays  Previous Interventions/Treatments: home PT    Prior Level of Function (PLOF)  Patient previously independent with all ADLs  Exercise/Physical Activity: Walk at [employer] 3-4 days per week up to 4 miles. Had to stop due to B knee pain.  Work/School: Retired  Hobbies: Gardening, Reading    Patients Living Environment: Lives in multi-story home with bed and bath on first floor. Lives with her .    Primary Language: English    Patient's Goal(s) for Therapy: Be able to walk outside, do yard work, and walk at [employer] for exercise.    Red Flags: Do you have any of the following? No  Fever/chills, unexplained weight changes, dizziness/fainting, unexplained change in bowel or bladder functions, unexplained malaise or muscle weakness, night pain/sweats, numbness or tingling    Objective:  Objective       ROM      Hip AROM (Degrees)      (R)  (L)  Flexion: WFL  WFL       Knee AROM (Degrees)      (R)  (L)  Flexion: 118  130      Extension: Lacks 18 Lacks 7         Ankle AROM (Degrees)      (R)  (L)  Plantarflexion: WFL  WFL      Dorsiflexion: WFL  WFL                 Strength Testing      Hip      (R)  (L)  Flexion: 4/5  4/5     Extension: 4-/5  4-/5    Abduction: 4/5  4/5    Adduction: 4/5  4/5         Knee    (R)  (L)  Flexion: 4+/5  4/5      Extension: 4+/5  4/5     Ankle    (R)  (L)  Plantarflexion: 4+/5  4+/5      Dorsiflexion: 4+/5  4+/5               Functional Screening    Posture: shoulder rounded forward and head forward    Lower Extremity Functional Movements  Transfers: Independent  Gait: antalgicPatient ambulating with increased R/L knee flexion during stance , mild R knee valgus  Assistive Device: no device  Balance  Feet Together: 30 seconds  Tandem: Unable to perform on R or L  SLS: Unable to perform on R or L  Timed Sit to Stand: 10 reps in 30 seconds    Palpation: No significant TTP L knee    Joint  Mobility: Decreased mobility/guard with L knee extension     Flexibility: Mod. Restrict. R/L hamstrings and R/L gastrocs.    Observation: L anterior knee incision healing well, no S/S of infection, mild L global knee edema          Special Tests  NT due to recent L TKR    Outcome Measures:  Other Measures  Lower Extremity Funtional Score (LEFS): 58/80     EDUCATION:   Individual(s) Educated: Patient  Education Provided: Home exercise program, plan of care, activity modifications, pain management, and injury pathology  Handout(s) Provided: Scanned into chart  Home Program: See below treatment  Risk and Benefits Discussed with Patient/Caregiver/Other: Yes   Patient/Caregiver Demonstrated Understanding: Yes   Plan of Care Discussed and Agreed Upon: Yes   Patient Response to Education: Patient/Caregiver verbalized understanding of information, Patient/Caregiver performed return demonstration of exercises/activities, and Patient/Caregiver asked appropriate questions    Assessment: Patient presents to PT s/p L TKR on 05/09/2024 due to OA, resulting in limited participation in pain-free ADLs and inability to perform at their prior level of function. The patient also has significant R knee OA and is hoping to get a R TKR this year as well. The patient has full L knee flexion AROM but is missing full L knee extension P/AROM. The patient is only using a std cane for community ambulation now. The patient demonstrates decreased L LE strength, flexibility, balance, and gait as well. Skilled PT warranted to address the above stated impairments, so the patient can perform FA's without increased pain or difficulty.    PT Assessment Results: Decreased strength, Decreased range of motion, Decreased endurance, Impaired balance, Decreased mobility  Rehab Prognosis: Excellent    Clinical Presentation: Stable and/or uncomplicated characteristics    Plan:  Treatment/Interventions: Education/ Instruction, Electrical stimulation, Dry  needling, Gait training, Hot pack, Manual therapy, Neuromuscular re-education, Therapeutic activities, Therapeutic exercises  PT Plan: Skilled PT  PT Frequency: 2 times per week  Duration: 5 weeks  Onset Date: 05/09/24  Certification Period Start Date: 05/28/24  Certification Period End Date: 07/02/24  Number of Treatments Authorized: 1 of 10 ([place])  Rehab Potential: Excellent  Plan of Care Agreement: Patient    Goals: Set and discussed today  Active       PT Problem       Patient will be independent with HEP.        Start:  05/28/24    Expected End:  06/11/24            Patient will increase L knee extension AROM by at least 5 degrees in order to improve her L TKE during gait.       Start:  05/28/24    Expected End:  07/02/24            Patient will demonstrate 5/5 strength with L knee flexion and extension MMT in order to improve her ability to ambulate in the community and negotiate steps with reciprocal pattern.       Start:  05/28/24    Expected End:  07/02/24            Patient will report no >/= 2/10 L knee pain with sleeping up to 8 hours to improve her quality of sleep.       Start:  05/28/24    Expected End:  07/02/24            Patient will score >/= 67/80 in order to improve her ability to perform FA's without difficulty.       Start:  05/28/24    Expected End:  07/02/24                Plan of care was developed with input and agreement by the patient    Treatment Performed:  Access Code: [email or web address]  URL: [email or web address]  Date: 05/28/2024  Prepared by: Mechelle Almendarez    Exercises  - Long Sitting Quad Set with Towel Roll Under Heel  - 1 x daily - 7 x weekly - 2 sets - 10 reps - 5 second hold  - Seated Hamstring Stretch  - 2 x daily - 7 x weekly - 3 sets - 1 reps - 30 seconds hold    Mechelle Almendarez, PTe

## 2024-06-04 ENCOUNTER — TREATMENT (OUTPATIENT)
Dept: PHYSICAL THERAPY | Facility: HOSPITAL | Age: 79
End: 2024-06-04
Payer: MEDICARE

## 2024-06-04 DIAGNOSIS — M25.562 LEFT KNEE PAIN, UNSPECIFIED CHRONICITY: ICD-10-CM

## 2024-06-04 PROCEDURE — 97110 THERAPEUTIC EXERCISES: CPT | Mod: GP,CQ

## 2024-06-04 PROCEDURE — 97140 MANUAL THERAPY 1/> REGIONS: CPT | Mod: GP,CQ

## 2024-06-04 ASSESSMENT — PAIN - FUNCTIONAL ASSESSMENT: PAIN_FUNCTIONAL_ASSESSMENT: 0-10

## 2024-06-04 ASSESSMENT — PAIN SCALES - GENERAL: PAINLEVEL_OUTOF10: 0 - NO PAIN

## 2024-06-04 NOTE — PROGRESS NOTES
"  Physical Therapy Treatment    Patient Name: Domenica Estrada  MRN: [id number]  Today's Date: 6/4/2024  Time Calculation  Start Time: 0957  Stop Time: 1040  Time Calculation (min): 43 min        PT Therapeutic Procedures Time Entry  Manual Therapy Time Entry: 8  Therapeutic Exercise Time Entry: 35                Current Problem  1. Left knee pain, unspecified chronicity  Follow Up In Physical Therapy          General  Reason for Referral: L knee pain  Referred By: Dr. Wise  Past Medical History Relevant to Rehab: R knee pain due to OA    Subjective   Current Condition:   Same  Patient reports she has been working on the exercises issued last PT session.  ..    Performing HEP?: Yes    Precautions  Precautions  LE Weight Bearing Status: Weight Bearing as Tolerated  Medical Precautions: Fall precautions  Post-Surgical Precautions: Left total knee precautions  Precautions Comment: L TKR protocol- 05/09/2024 DOS  Pain  Pain Assessment: 0-10  Pain Score: 0 - No pain  Pain Location: Knee  Pain Orientation: Left    Objective   KNEE    Treatments:    Therapeutic Exercise  Therapeutic Exercise Performed: Yes  Therapeutic Exercise Activity 1: Bike Level 1 Seat 7 x5'  Therapeutic Exercise Activity 2: Gastroc stretch x2 30\"  Therapeutic Exercise Activity 3: TKE with Ball x15 3\"  Therapeutic Exercise Activity 4: Hamstring stretch x2 30\"  Therapeutic Exercise Activity 5: clamshell x20  Therapeutic Exercise Activity 6: Bridge x2  Therapeutic Exercise Activity 7: STS x10  Therapeutic Exercise Activity 8: Hamstring curls Green x20  Therapeutic Exercise Activity 9: Step ups with opposite knee drive 4\" x15  Therapeutic Exercise Activity 10: Tandem walk fwd/bkwd 6' x3  Therapeutic Exercise Activity 11: HR x20         Manual Therapy  Manual Therapy Performed: Yes  Manual Therapy Activity 1: Knee extension mobs to increase ROM  Manual Therapy Activity 2: L Patellar mobs sup/inf med/lat to increase mobility                   EDUCATION: "   Individual(s) Educated: Patient  Education Provided:   Risk and Benefits Discussed with Patient/Caregiver/Other: Yes   Patient/Caregiver Demonstrated Understanding: Yes   Patient Response to Education: Patient/Caregiver verbalized understanding of information    Assessment: Pt was able to tolerate joint mobilizations for extension however unable to tolerate manual stretching d/t pain.  Pt was able to tolerate progression of PRE's without c/o increased pain in the L knee.   PT Assessment  PT Assessment Results: Decreased strength, Decreased range of motion, Decreased endurance, Impaired balance, Decreased mobility  Rehab Prognosis: Excellent    Plan: Continue to progress current POC as tolerated to facilitate ability to perform functional activities.   OP PT Plan  PT Plan: Skilled PT  PT Frequency: 2 times per week  Duration: 5 weeks  Onset Date: 05/09/24  Certification Period Start Date: 05/28/24  Certification Period End Date: 07/02/24  Number of Treatments Authorized: 2 of 10    Goals:  Active       PT Problem       Patient will be independent with HEP.        Start:  05/28/24    Expected End:  06/11/24            Patient will increase L knee extension AROM by at least 5 degrees in order to improve her L TKE during gait.       Start:  05/28/24    Expected End:  07/02/24            Patient will demonstrate 5/5 strength with L knee flexion and extension MMT in order to improve her ability to ambulate in the community and negotiate steps with reciprocal pattern.       Start:  05/28/24    Expected End:  07/02/24            Patient will report no >/= 2/10 L knee pain with sleeping up to 8 hours to improve her quality of sleep.       Start:  05/28/24    Expected End:  07/02/24            Patient will score >/= 67/80 in order to improve her ability to perform FA's without difficulty.       Start:  05/28/24    Expected End:  07/02/24                 Valente Harden, PTA

## 2024-06-06 ENCOUNTER — TREATMENT (OUTPATIENT)
Dept: PHYSICAL THERAPY | Facility: HOSPITAL | Age: 79
End: 2024-06-06
Payer: MEDICARE

## 2024-06-06 DIAGNOSIS — M25.562 LEFT KNEE PAIN, UNSPECIFIED CHRONICITY: ICD-10-CM

## 2024-06-06 PROCEDURE — 97140 MANUAL THERAPY 1/> REGIONS: CPT | Mod: GP,CQ

## 2024-06-06 PROCEDURE — 97110 THERAPEUTIC EXERCISES: CPT | Mod: GP,CQ

## 2024-06-06 ASSESSMENT — PAIN SCALES - GENERAL: PAINLEVEL_OUTOF10: 0 - NO PAIN

## 2024-06-06 ASSESSMENT — PAIN - FUNCTIONAL ASSESSMENT: PAIN_FUNCTIONAL_ASSESSMENT: 0-10

## 2024-06-06 NOTE — PROGRESS NOTES
"  Physical Therapy Treatment    Patient Name: Domenica Estrada  MRN: [id number]  Today's Date: 6/6/2024  Time Calculation  Start Time: 1513  Stop Time: 1558  Time Calculation (min): 45 min        PT Therapeutic Procedures Time Entry  Manual Therapy Time Entry: 8  Therapeutic Exercise Time Entry: 37                Current Problem  1. Left knee pain, unspecified chronicity  Follow Up In Physical Therapy          General  Reason for Referral: L knee pain  Referred By: Dr. Wise  Past Medical History Relevant to Rehab: R knee pain due to OA    Subjective   Current Condition:   Same  Patient reports she had bilateral knee pain after last PT session but not significant.      Performing HEP?: Yes    Precautions  Precautions  LE Weight Bearing Status: Weight Bearing as Tolerated  Medical Precautions: Fall precautions  Post-Surgical Precautions: Left total knee precautions  Precautions Comment: L TKR protocol- 05/09/2024 DOS  Pain  Pain Assessment: 0-10  Pain Score: 0 - No pain  Pain Location: Knee  Pain Orientation: Left    Objective   KNEE       Knee AROM: Extension 6*       Treatments:    Therapeutic Exercise  Therapeutic Exercise Performed: Yes  Therapeutic Exercise Activity 1: Bike Level 3 Seat 7 x5'  Therapeutic Exercise Activity 2: Gastroc stretch x2 30\"  Therapeutic Exercise Activity 3: TKE Green x20  Therapeutic Exercise Activity 4: HR with TKE x20  Therapeutic Exercise Activity 5: rocking 45\" R/L  Therapeutic Exercise Activity 6: standing hip extension and abduction yellow x10 R/L  Therapeutic Exercise Activity 9: Step ups with opposite knee drive 4\" 2x10  Therapeutic Exercise Activity 10: Retro walk 10'x4  Therapeutic Exercise Activity 11: Airex chops 2#x10  Therapeutic Exercise Activity 12: Lateral Step ups with opposite knee drive 4\" x20  Therapeutic Exercise Activity 13: Airex         Manual Therapy  Manual Therapy Performed: Yes  Manual Therapy Activity 1: Knee extension mobs to increase ROM  Manual Therapy " Activity 2: L Patellar mobs sup/inf med/lat to increase mobility                   EDUCATION:   Individual(s) Educated: Patient  Education Provided:   Patient/Caregiver Demonstrated Understanding: Yes   Patient Response to Education: Patient/Caregiver verbalized understanding of information    Assessment: Pt challenged with balance exercises, with intermittent UE support required to recover from LOB. Pt not able to tolerate gentle stretching into L knee extension, however she is able to tolerate mobilizations to improve this movement. Pt reported no change with pain level in the L knee at the end of session.   PT Assessment  PT Assessment Results: Decreased strength, Decreased range of motion, Decreased endurance, Impaired balance, Decreased mobility  Rehab Prognosis: Excellent    Plan: Continue to progress current POC as tolerated to facilitate ability to perform functional activities.   OP PT Plan  PT Plan: Skilled PT  PT Frequency: 2 times per week  Duration: 5 weeks  Onset Date: 05/09/24  Certification Period Start Date: 05/28/24  Certification Period End Date: 07/02/24  Number of Treatments Authorized: 3 of 10    Goals:  Active       PT Problem       Patient will be independent with HEP.        Start:  05/28/24    Expected End:  06/11/24            Patient will increase L knee extension AROM by at least 5 degrees in order to improve her L TKE during gait.       Start:  05/28/24    Expected End:  07/02/24            Patient will demonstrate 5/5 strength with L knee flexion and extension MMT in order to improve her ability to ambulate in the community and negotiate steps with reciprocal pattern.       Start:  05/28/24    Expected End:  07/02/24            Patient will report no >/= 2/10 L knee pain with sleeping up to 8 hours to improve her quality of sleep.       Start:  05/28/24    Expected End:  07/02/24            Patient will score >/= 67/80 in order to improve her ability to perform FA's without difficulty.        Start:  05/28/24    Expected End:  07/02/24                 Valente Harden, PTA

## 2024-06-11 ENCOUNTER — TREATMENT (OUTPATIENT)
Dept: PHYSICAL THERAPY | Facility: HOSPITAL | Age: 79
End: 2024-06-11
Payer: MEDICARE

## 2024-06-11 DIAGNOSIS — M25.562 LEFT KNEE PAIN, UNSPECIFIED CHRONICITY: ICD-10-CM

## 2024-06-11 PROCEDURE — 97110 THERAPEUTIC EXERCISES: CPT | Mod: GP | Performed by: PHYSICAL THERAPIST

## 2024-06-11 PROCEDURE — 97140 MANUAL THERAPY 1/> REGIONS: CPT | Mod: GP | Performed by: PHYSICAL THERAPIST

## 2024-06-11 ASSESSMENT — PAIN SCALES - GENERAL: PAINLEVEL_OUTOF10: 0 - NO PAIN

## 2024-06-11 ASSESSMENT — PAIN - FUNCTIONAL ASSESSMENT: PAIN_FUNCTIONAL_ASSESSMENT: 0-10

## 2024-06-11 NOTE — PROGRESS NOTES
"  Physical Therapy Treatment    Patient Name: Domenica Estrada  MRN: [id number]  Encounter Date: 6/11/2024  Time Calculation  Start Time: 1517  Stop Time: 1557  Time Calculation (min): 40 min    PT Therapeutic Procedures Time Entry  Manual Therapy Time Entry: 9  Therapeutic Exercise Time Entry: 31      Current Problem  Problem List Items Addressed This Visit             ICD-10-CM    Left knee pain M25.562     1. Left knee pain, unspecified chronicity  Follow Up In Physical Therapy          General  Reason for Referral: L knee pain  Referred By: Dr. Wise  Past Medical History Relevant to Rehab: R knee pain due to OA    Subjective   Current Condition:   Better  Patient reports that the L knee is coming along well after her surgery. She sees Dr. Wise on 6/20/2024 and would like to discuss setting up a date to get her R knee replaced, as her R knee pain is limiting her progress. The patient's L knee pain is improving at night and she can go up curb steps more easily. She is performing her HEP regularly.     Performing HEP?: Yes    Precautions  Precautions  LE Weight Bearing Status: Weight Bearing as Tolerated  Medical Precautions: Fall precautions  Post-Surgical Precautions: Left total knee precautions  Precautions Comment: L TKR protocol- 05/09/2024 DOS  Pain  Pain Assessment: 0-10  Pain Score: 0 - No pain  Pain Location: Knee  Pain Orientation: Left    Objective     L knee lacks 4 degrees to extension post TE's and manual     Treatments:    Therapeutic Exercise  Therapeutic Exercise Performed: Yes  Therapeutic Exercise Activity 1: Bike Level 3 Seat 7 x 5'  Therapeutic Exercise Activity 2: L hamstring stretch on step 2 x 30\"  Therapeutic Exercise Activity 3: Gastroc stretch 2 x 30\"  Therapeutic Exercise Activity 4: Step ups with opposite knee drive focus on TKE 4\" 2x10  Therapeutic Exercise Activity 5: L knee self flexion mobs on step (L) x 60\"  Therapeutic Exercise Activity 6: Retro walk focus on TKE " 10'x4  Therapeutic Exercise Activity 7: L TKE black band x 20  Therapeutic Exercise Activity 8: B HR with L TKE black band x 20    Manual Therapy  Manual Therapy Performed: Yes  Manual Therapy Activity 1: Knee extension mobs to increase ROM  Manual Therapy Activity 2: L Patellar mobs sup/inf med/lat to increase mobility      EDUCATION:   Individual(s) Educated: Patient  Education Provided: Reviewed HEP for knee extension  Handout(s) Provided: Scanned into chart  Home Program: Continue with HEP, focus especially on heel props and posterior L LE stretching  Risk and Benefits Discussed with Patient/Caregiver/Other: Yes   Patient/Caregiver Demonstrated Understanding: Yes   Patient Response to Education: Patient/Caregiver verbalized understanding of information, Patient/Caregiver performed return demonstration of exercises/activities, and Patient/Caregiver asked appropriate questions    Assessment:   The patient continues to lack full L TKE during midstance. PT discussed the importance of working on her L knee extension ROM in order to normalize her gait pattern. She is ambulating short community distances without her cane now.   PT Assessment  PT Assessment Results: Decreased strength, Decreased range of motion, Decreased endurance, Impaired balance, Decreased mobility  Rehab Prognosis: Excellent    Plan: Continue with POC.   Continue with core stability, LE strengthening, ROM, flexibility, and balance, so the patient can perform FA's without increased pain or difficulty.   Focus on achieving end range L knee extension P/AROM  OP PT Plan  PT Plan: Skilled PT  PT Frequency: 2 times per week  Duration: 5 weeks  Onset Date: 05/09/24  Certification Period Start Date: 05/28/24  Certification Period End Date: 07/02/24  Number of Treatments Authorized: 4 of 10  Payor: MEDICARE / Plan: MEDICARE PART A AND B / Product Type: *No Product type* /     Goals:  Active       PT Problem       Patient will be independent with HEP.         Start:  05/28/24    Expected End:  06/11/24            Patient will increase L knee extension AROM by at least 5 degrees in order to improve her L TKE during gait.       Start:  05/28/24    Expected End:  07/02/24            Patient will demonstrate 5/5 strength with L knee flexion and extension MMT in order to improve her ability to ambulate in the community and negotiate steps with reciprocal pattern.       Start:  05/28/24    Expected End:  07/02/24            Patient will report no >/= 2/10 L knee pain with sleeping up to 8 hours to improve her quality of sleep.       Start:  05/28/24    Expected End:  07/02/24            Patient will score >/= 67/80 in order to improve her ability to perform FA's without difficulty.       Start:  05/28/24    Expected End:  07/02/24                 Mechelle Almendarez, PT

## 2024-06-14 ENCOUNTER — TREATMENT (OUTPATIENT)
Dept: PHYSICAL THERAPY | Facility: HOSPITAL | Age: 79
End: 2024-06-14
Payer: MEDICARE

## 2024-06-14 DIAGNOSIS — M25.562 LEFT KNEE PAIN, UNSPECIFIED CHRONICITY: ICD-10-CM

## 2024-06-14 PROCEDURE — 97110 THERAPEUTIC EXERCISES: CPT | Mod: GP,CQ

## 2024-06-14 ASSESSMENT — PAIN - FUNCTIONAL ASSESSMENT: PAIN_FUNCTIONAL_ASSESSMENT: 0-10

## 2024-06-14 ASSESSMENT — PAIN SCALES - GENERAL: PAINLEVEL_OUTOF10: 0 - NO PAIN

## 2024-06-14 NOTE — PROGRESS NOTES
"  Physical Therapy Treatment    Patient Name: Domenica Estrada  MRN: [id number]  Today's Date: 6/14/2024  Time Calculation  Start Time: 1214  Stop Time: 1300  Time Calculation (min): 46 min    Current Problem  1. Left knee pain, unspecified chronicity  Follow Up In Physical Therapy          General  Reason for Referral: L knee pain  Referred By: Dr. Wise  Past Medical History Relevant to Rehab: R knee pain due to OA    Subjective   Current Condition:   Better  Patient reports her L knee is feeling great, she has no pain, but experiences some edema and tightness. Her R knee is painful and she is working on an appointment to get it replaced.    Performing HEP?: Yes    Precautions  Precautions  LE Weight Bearing Status: Weight Bearing as Tolerated  Medical Precautions: Fall precautions  Post-Surgical Precautions: Left total knee precautions  Precautions Comment: L TKR protocol- 05/09/2024 DOS    Pain  Pain Assessment: 0-10  Pain Score: 0 - No pain  Pain Location: Knee  Pain Orientation: Left    Objective   KNEE  Observation   Pt. Doing well, has full flexion and lacks 5 degrees of ext., but Pt. Feels confident and strong without assistive device on L.  Knee AROM   5-120  Gait   Slight limp on R, and compromised heel strike on L due to HS tighness/quad weakness.  Flexibility   Decreased HS, gastroc flexibility  Treatments:    Therapeutic Exercise  Therapeutic Exercise Performed: Yes  Therapeutic Exercise Activity 1: Bike Level 3 Seat 7 x 5'  Therapeutic Exercise Activity 2: L hamstring stretch on step 2 x 30\"  Therapeutic Exercise Activity 3: Gastroc stretch 2 x 30\"  Therapeutic Exercise Activity 4: Step ups with opposite knee drive focus on TKE 4\" 2x10  Therapeutic Exercise Activity 5: L knee self flexion mobs on step (L) x 60\"  Therapeutic Exercise Activity 6: Retro walk focus on TKE 10'x6  Therapeutic Exercise Activity 7: L TKE black band x 20  Therapeutic Exercise Activity 8: B HR with L TKE black band x " 20  Therapeutic Exercise Activity 9: QS with SLR 2 x 10    Manual Therapy  Manual Therapy Performed: Yes  Manual Therapy Activity 1: knee extension  Manual Therapy Activity 2: L patellar mobes sup/inf, med/lat  Assessment: Pt. Able to tolerate all exercises on her L knee and performed some on her R. Pt. Reports her R knee is painful and she will be getting it replaced soon. Pt. Continues to demonstrate quad lag with SLR, but was able to tolerate manual knee extension. Pt. Does present with L Knee flexion of 120 degrees.  PT Assessment  PT Assessment Results: Decreased strength, Decreased range of motion, Decreased endurance, Impaired balance, Decreased mobility  Rehab Prognosis: Excellent    Plan: continue with current PT POC with focus on knee extension and L LE strengthening for return to normal functional activities.  OP PT Plan  PT Plan: Skilled PT  PT Frequency: 2 times per week  Duration: 5 weeks  Onset Date: 05/09/24  Certification Period Start Date: 05/28/24  Certification Period End Date: 07/02/24  Number of Treatments Authorized: 5 of 10    Goals:  Active       PT Problem       Patient will be independent with HEP.        Start:  05/28/24    Expected End:  06/11/24            Patient will increase L knee extension AROM by at least 5 degrees in order to improve her L TKE during gait.       Start:  05/28/24    Expected End:  07/02/24            Patient will demonstrate 5/5 strength with L knee flexion and extension MMT in order to improve her ability to ambulate in the community and negotiate steps with reciprocal pattern.       Start:  05/28/24    Expected End:  07/02/24            Patient will report no >/= 2/10 L knee pain with sleeping up to 8 hours to improve her quality of sleep.       Start:  05/28/24    Expected End:  07/02/24            Patient will score >/= 67/80 in order to improve her ability to perform FA's without difficulty.       Start:  05/28/24    Expected End:  07/02/24                  Valarie Sullivan, PTA

## 2024-06-18 ENCOUNTER — TREATMENT (OUTPATIENT)
Dept: PHYSICAL THERAPY | Facility: HOSPITAL | Age: 79
End: 2024-06-18
Payer: MEDICARE

## 2024-06-18 DIAGNOSIS — M25.562 LEFT KNEE PAIN, UNSPECIFIED CHRONICITY: ICD-10-CM

## 2024-06-18 PROCEDURE — 97140 MANUAL THERAPY 1/> REGIONS: CPT | Mod: GP

## 2024-06-18 PROCEDURE — 97110 THERAPEUTIC EXERCISES: CPT | Mod: GP

## 2024-06-18 ASSESSMENT — PAIN SCALES - GENERAL: PAINLEVEL_OUTOF10: 6

## 2024-06-18 ASSESSMENT — PAIN - FUNCTIONAL ASSESSMENT: PAIN_FUNCTIONAL_ASSESSMENT: 0-10

## 2024-06-18 NOTE — PROGRESS NOTES
"  Physical Therapy Treatment    Patient Name: Dmoenica Estrada  MRN: [id number]  Today's Date: 6/18/2024  Payor: MEDICARE / Plan: MEDICARE PART A AND B / Product Type: *No Product type* /   Time Calculation  Start Time: 0959  Stop Time: 1042  Time Calculation (min): 43 min        PT Therapeutic Procedures Time Entry  Manual Therapy Time Entry: 8  Therapeutic Exercise Time Entry: 35      Current Problem  1. Left knee pain, unspecified chronicity  Follow Up In Physical Therapy        Problem List Items Addressed This Visit             ICD-10-CM       Musculoskeletal and Injuries    Left knee pain M25.562       General  Reason for Referral: L knee pain  Referred By: Dr. Wise  Past Medical History Relevant to Rehab: R knee pain due to OA    Subjective   Current Condition:   Better  Patient reports no pain in surgical side, but pain on non surgical side. Pt reported concerns about how soon she will be able to get her next  knee replaced, pt was encouraged to discuss concerns w  at next krish.    Performing HEP?: Yes    Precautions  Precautions  LE Weight Bearing Status: Weight Bearing as Tolerated  Medical Precautions: Fall precautions  Post-Surgical Precautions: Left total knee precautions  Precautions Comment: L TKR protocol- 05/09/2024 DOS  Pain  Pain Assessment: 0-10  Pain Score: 6  Pain Location: Knee  Pain Orientation: Right    Objective     Treatments:    Therapeutic Exercise  Therapeutic Exercise Performed: Yes  Therapeutic Exercise Activity 1: Bike Level 3 Seat 7 x 6'  Therapeutic Exercise Activity 2: L hamstring stretch on step 2 x 30\"  Therapeutic Exercise Activity 5: 2x10 mini squats  Therapeutic Exercise Activity 6: prone knee flexion w strap 3x30 sec ea  Therapeutic Exercise Activity 7: L TKE black band 3x10 w hold at end range  Therapeutic Exercise Activity 8: prone knee hang w 2# on ankle. 3x30 sec  Therapeutic Exercise Activity 9: knee flex stretch on steps. 2x10 w slight hold at end range  Therapeutic " Exercise Activity 10: clovis fwd step ups on 6'' step. 2x10         Manual Therapy  Manual Therapy Performed: Yes  Manual Therapy Activity 1: Knee extension mobs to increase ROM.     EDUCATION:   Individual(s) Educated: Patient  Education Provided: HEP   Handout(s) Provided: Scanned into chart  Home Program: same as previous  Risk and Benefits Discussed with Patient/Caregiver/Other: Yes   Patient/Caregiver Demonstrated Understanding: Yes   Patient Response to Education: Patient/Caregiver verbalized understanding of information, Patient/Caregiver performed return demonstration of exercises/activities, and Patient/Caregiver asked appropriate questions    Assessment:   Pt making steady progress. Pt tolerated session well. Pt cont to have moderate ROM deficits and poor L knee ext. Pt having difficulty w R knee more so than L, and will communicate that w her dr on thurs. Pt requires cont skilled PT intervention to address deficits and help pt return to PLOF.      Plan: Continue with POC.   OP PT Plan  PT Plan: Skilled PT  PT Frequency: 2 times per week  Duration: 5 weeks  Onset Date: 05/09/24  Certification Period Start Date: 05/28/24  Certification Period End Date: 07/02/24  Number of Treatments Authorized: 6 of 10    Goals:  Active       PT Problem       Patient will be independent with HEP.  (Progressing)       Start:  05/28/24    Expected End:  06/11/24            Patient will increase L knee extension AROM by at least 5 degrees in order to improve her L TKE during gait. (Progressing)       Start:  05/28/24    Expected End:  07/02/24            Patient will demonstrate 5/5 strength with L knee flexion and extension MMT in order to improve her ability to ambulate in the community and negotiate steps with reciprocal pattern. (Progressing)       Start:  05/28/24    Expected End:  07/02/24            Patient will report no >/= 2/10 L knee pain with sleeping up to 8 hours to improve her quality of sleep. (Progressing)        Start:  05/28/24    Expected End:  07/02/24            Patient will score >/= 67/80 in order to improve her ability to perform FA's without difficulty. (Progressing)       Start:  05/28/24    Expected End:  07/02/24                 Hortensia Perdomo, PT

## 2024-06-20 ENCOUNTER — APPOINTMENT (OUTPATIENT)
Dept: ORTHOPEDIC SURGERY | Facility: CLINIC | Age: 79
End: 2024-06-20
Payer: MEDICARE

## 2024-06-20 ENCOUNTER — HOSPITAL ENCOUNTER (OUTPATIENT)
Dept: RADIOLOGY | Facility: HOSPITAL | Age: 79
Discharge: HOME | End: 2024-06-20
Payer: MEDICARE

## 2024-06-20 DIAGNOSIS — G89.29 CHRONIC PAIN OF RIGHT KNEE: Primary | ICD-10-CM

## 2024-06-20 DIAGNOSIS — Z96.659 HISTORY OF REVISION OF TOTAL KNEE ARTHROPLASTY: ICD-10-CM

## 2024-06-20 DIAGNOSIS — M17.11 ARTHRITIS OF RIGHT KNEE: ICD-10-CM

## 2024-06-20 DIAGNOSIS — M25.561 CHRONIC PAIN OF RIGHT KNEE: Primary | ICD-10-CM

## 2024-06-20 PROCEDURE — 73560 X-RAY EXAM OF KNEE 1 OR 2: CPT | Mod: LT

## 2024-06-20 PROCEDURE — 1159F MED LIST DOCD IN RCRD: CPT | Performed by: ORTHOPAEDIC SURGERY

## 2024-06-20 PROCEDURE — 1160F RVW MEDS BY RX/DR IN RCRD: CPT | Performed by: ORTHOPAEDIC SURGERY

## 2024-06-20 PROCEDURE — 73564 X-RAY EXAM KNEE 4 OR MORE: CPT | Mod: RT

## 2024-06-20 PROCEDURE — 1036F TOBACCO NON-USER: CPT | Performed by: ORTHOPAEDIC SURGERY

## 2024-06-20 PROCEDURE — 99215 OFFICE O/P EST HI 40 MIN: CPT | Performed by: ORTHOPAEDIC SURGERY

## 2024-06-20 RX ORDER — SODIUM CHLORIDE 9 MG/ML
100 INJECTION, SOLUTION INTRAVENOUS CONTINUOUS
OUTPATIENT
Start: 2024-06-20

## 2024-06-20 RX ORDER — ACETAMINOPHEN 325 MG/1
975 TABLET ORAL ONCE
OUTPATIENT
Start: 2024-06-20 | End: 2024-06-20

## 2024-06-20 RX ORDER — CEFAZOLIN SODIUM 2 G/100ML
2 INJECTION, SOLUTION INTRAVENOUS ONCE
OUTPATIENT
Start: 2024-06-20 | End: 2024-06-20

## 2024-06-20 RX ORDER — CELECOXIB 400 MG/1
400 CAPSULE ORAL ONCE
OUTPATIENT
Start: 2024-06-20 | End: 2024-06-20

## 2024-06-20 NOTE — PROGRESS NOTES
This is a consultation from Dr. Jo Mota MD for   Chief Complaint   Patient presents with    Left Knee - Pain     5/9/24 LT TKA   Right knee pain    This is a 78 y.o. female who presents for follow-up for her left total knee and also for evaluation of her right knee.  She is about 6 weeks out from left total knee, she is doing great.  She has no pain in the knee she has excellent resolution of her preoperative symptoms and pain.  No drainage from her incision no fevers no chills.  She is progressing very well with physical therapy.  Unfortunately as she is progressing her therapy on the left side the right is getting worse.  She has had right knee pain for a long time this is a chronic issue but she is experience severe exacerbation as she has been walking more over the last few weeks.  Sharp pain over the medial and anterior knee worse with walking.  Knee feels unstable.  Has had extensive trial of nonsurgical management with use of anti-inflammatories physical therapy activity modification use of assistive devices cortisone injections.  Despite that she has severe and worsening pain which impacts her quality of life and activities of daily living she would like to consider total knee    Physical Exam    There has been no interval change in this patient's past medical, surgical, medications, allergies, family history or social history since the most recent visit to a provider within our department. 14 point review of systems was performed, reviewed, and negative except for pertinent positives documented in the history of present illness.     Constitutional: well developed, well nourished female in no acute distress  Psychiatric: normal mood, appropriate affect  Eyes: sclera anicteric  HENT: normocephalic/atraumatic  CV: regular rate and rhythm   Respiratory: non labored breathing  Integumentary: no rash  Neurological: moves all extremities    Right knee exam: skin intact no lacerations or abrations.  1+  effusion.  Tender medial joint line. negative log roll negative patellar grind. ROM 5-100. stable to varus and valgus stress at 0 and 30 degrees. negative lachman negative posterior drawer negative katerin. 5/5 ehl/fhl/gs/ta. silt s/s/sp/dp/t. 2+ dp/pt    Left knee examination: Well-healed surgical incision erythema no drainage stable varus valgus stress range of motion from 0 to 120 degrees neurovascular tact distally    Xrays were ordered by me, they were reviewed and independently interpreted by me today, they show stable left total knee arthroplasty no fracture dislocation or loosening, right side shows severe degenerative disease with bone-on-bone arthritis subchondral sclerosis osteophyte formation Kellgren-Ta grade 4    Procedures      Impression/Plan: This is a 78 y.o. female status post left total knee doing well.  We discussed continuing physical therapy and home exercise program. Pain medications to be used as needed. Assistive devices as needed per PT. We discussed DVT prophylaxis until 6 weeks. No dentist until 3 months and thereafter dental prophylaxis for life. Activity as tolerated weightbearing as tolerated. I have personally reviewed the OARRS report for the patient. This report is scanned into the electronic medical record. I have considered the risks of abuse, dependence, addiction and diversion.    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  Patient like to proceed with right total knee.    I had an extensive discussion with the patient regarding her condition and possible treatment options. Nonsurgical treatment for right knee arthritis includes activity modification, weight loss, use of a cane or other assistive device, pain medications and nonsteroidal anti-inflammatory medications, joint injections, and physical therapy. The patient has attempted non-surgical treatment for this condition for greater than 6 months and it  has failed. We discussed the risks benefits and alternatives of total knee arthroplasty. Benefits of joint replacement include: Relief of pain, improvement of function, and improved quality of life. Alternatives include observation and watchful waiting, and the nonsurgical modalities noted above.     Discussed with the patient that during total knee arthroplasty prosthetic resurfacing of the patella may or may not be performed at the discretion of thesurgeon during surgery. In the event that prosthetic patellar resurfacing is not performed, nonprosthetic arthroplasty will be performed with removal of bone spurs, circumferential synovectomy and electrocautery. Patient was informed that anterior knee pain and patellofemoral crepitus can potentially occur after knee surgery with or without prosthetic patellar resurfacing.    We discussed the risks of complications as well as the risks of morbidity and mortality related to surgical treatment with total joint replacement. We reviewed the fact that total joint replacement is major elective surgery with significant associated surgical and procedural risk factors as detailed below.   Risks include: Pain, blood loss, damage to nearby anatomical structures including but not limited to nerves or blood vessels muscles tendons and bone, failure surgery to ameliorate symptoms, persistence of pain surrounding the affected joint, mechanical failure of the prosthesis including but not limited to loosening of the prosthesis from bone ,breakage of the prosthesis and dislocation of the prosthesis, change in length or appearance of a limb, infection possibly necessitating further surgery, removal of the prosthesis, or limb amputation, the need for additional surgery for other reasons, blood clots, amputation, and death. No guarantees were implied or given.  All questions were answered and the patient voiced their understanding.     A complete set of surgical instructions was given to  "the patient. The patient was educated regarding preoperative nutrition, preparation of their home for postoperative rehabilitation, choosing a care partner, medical and dental clearance, the cessation of medications that can cause bleeding or presenting to risk for infection, chlorhexidine bath, nasal swab and decontamination, post operative follow up, and need for medical equipment. Patient was also educated regarding the possibility of same day surgery and related criteria and protocols. The patient will attend our joint class further education, and thereafter they will return for a preoperative visit. A presurgery education booklet was also given to the patient.     surgical plan: Right total knee  implants: DePuy standard  approach: Standard  DVT ppx aspirin  drugs to stop none  allergy to abx none  post operative abx: ancef +/- vanc (per protocol)  special clearance needed none    BMI Readings from Last 1 Encounters:   05/09/24 24.92 kg/m²      Lab Results   Component Value Date    CREATININE 0.90 05/10/2024     Tobacco Use: Low Risk  (6/20/2024)    Patient History     Smoking Tobacco Use: Never     Smokeless Tobacco Use: Never     Passive Exposure: Not on file      Computed MELD 3.0 unavailable. One or more values for this score either were not found within the given timeframe or did not fit some other criterion.  Computed MELD-Na unavailable. One or more values for this score either were not found within the given timeframe or did not fit some other criterion.       No results found for: \"HGBA1C\"  Lab Results   Component Value Date    STAPHMRSASCR No Staphylococcus aureus isolated 04/24/2024     "

## 2024-06-21 ENCOUNTER — TREATMENT (OUTPATIENT)
Dept: PHYSICAL THERAPY | Facility: HOSPITAL | Age: 79
End: 2024-06-21
Payer: MEDICARE

## 2024-06-21 DIAGNOSIS — M25.562 LEFT KNEE PAIN, UNSPECIFIED CHRONICITY: ICD-10-CM

## 2024-06-21 PROCEDURE — 97110 THERAPEUTIC EXERCISES: CPT | Mod: GP,CQ

## 2024-06-21 ASSESSMENT — PAIN - FUNCTIONAL ASSESSMENT: PAIN_FUNCTIONAL_ASSESSMENT: 0-10

## 2024-06-21 ASSESSMENT — PAIN SCALES - GENERAL: PAINLEVEL_OUTOF10: 0 - NO PAIN

## 2024-06-21 NOTE — PROGRESS NOTES
"  Physical Therapy Treatment    Patient Name: Domenica Estrada  MRN: [id number]  Today's Date: 6/21/2024  Time Calculation  Start Time: 0950  Stop Time: 1035  Time Calculation (min): 45 min    Current Problem  1. Left knee pain, unspecified chronicity  Follow Up In Physical Therapy          General  Reason for Referral: L knee pain  Referred By: Dr. Wise  Past Medical History Relevant to Rehab: R knee pain due to OA    Subjective   Current Condition:   Better  Patient reports she had seen doctor and he was happy with her progress on the L knee. She is not having L knee pain, but her R knee pain is at 6/10    Performing HEP?: Yes    Precautions  Precautions  LE Weight Bearing Status: Weight Bearing as Tolerated  Medical Precautions: Fall precautions  Post-Surgical Precautions: Left total knee precautions  Precautions Comment: L TKR protocol- 05/09/2024 DOS    Pain  Pain Assessment: 0-10  0-10 (Numeric) Pain Score: 0 - No pain  Pain Location: Knee  Pain Orientation: Left (Pain in R knee 6/10)    Objective   KNEE  Observation   Pt. Doing well with L TKA, but has difficulty with R knee which also needs replaced, and limits movement.  Knee AROM   3-122 degrees L knee    Knee MMT   5/5 L quad, HS  Gait   Limp on R due arthritis in R knee  Flexibility   WNL    Treatments:    Therapeutic Exercise  Therapeutic Exercise Performed: Yes  Therapeutic Exercise Activity 1: Bike Level 3 Seat 7 x 6'  Therapeutic Exercise Activity 2: L hamstring stretch on step 2 x 30\"  Therapeutic Exercise Activity 3: Gastroc stretch 2 x 30\"  Therapeutic Exercise Activity 4: Step ups with opposite knee drive focus on TKE 4\" 2x10  Therapeutic Exercise Activity 5: 2x10 mini squats  Therapeutic Exercise Activity 6: prone knee hang w 2# on ankle. 3x30 sec  Therapeutic Exercise Activity 7: L TKE black band 3x10 w hold at end range  Therapeutic Exercise Activity 8: prone knee hang w 2# on ankle. 3x30 sec  Therapeutic Exercise Activity 9: knee flex stretch " on steps. 2x10 w slight hold at end range    Manual Therapy  Manual Therapy Performed: Yes manula stretch into ext.  Assessment:Pt5. Able to tolerate all exercises on L knee without pain, but R knee is painful band gives out on her occasionally now. She is scheduled for replacement on R in September.  PT Assessment  PT Assessment Results: Decreased strength, Decreased range of motion, Decreased endurance, Impaired balance, Decreased mobility  Rehab Prognosis: Excellent    Plan: continue with PT POC with emphasis on strengthening and ROM on L knee without causing increased pain on R knee.  OP PT Plan  PT Plan: Skilled PT  PT Frequency: 2 times per week  Duration: 5 weeks  Onset Date: 05/09/24  Certification Period Start Date: 05/28/24  Certification Period End Date: 07/02/24  Number of Treatments Authorized: 7 of 10    Goals:  Active       PT Problem       Patient will be independent with HEP.  (Progressing)       Start:  05/28/24    Expected End:  06/11/24            Patient will increase L knee extension AROM by at least 5 degrees in order to improve her L TKE during gait. (Progressing)       Start:  05/28/24    Expected End:  07/02/24            Patient will demonstrate 5/5 strength with L knee flexion and extension MMT in order to improve her ability to ambulate in the community and negotiate steps with reciprocal pattern. (Progressing)       Start:  05/28/24    Expected End:  07/02/24            Patient will report no >/= 2/10 L knee pain with sleeping up to 8 hours to improve her quality of sleep. (Progressing)       Start:  05/28/24    Expected End:  07/02/24            Patient will score >/= 67/80 in order to improve her ability to perform FA's without difficulty. (Progressing)       Start:  05/28/24    Expected End:  07/02/24                 Valarie Sullivan, PTA

## 2024-06-24 PROBLEM — G89.29 CHRONIC PAIN OF RIGHT KNEE: Status: ACTIVE | Noted: 2024-06-20

## 2024-06-24 PROBLEM — M25.561 CHRONIC PAIN OF RIGHT KNEE: Status: ACTIVE | Noted: 2024-06-20

## 2024-06-25 ENCOUNTER — TREATMENT (OUTPATIENT)
Dept: PHYSICAL THERAPY | Facility: HOSPITAL | Age: 79
End: 2024-06-25
Payer: MEDICARE

## 2024-06-25 DIAGNOSIS — M25.562 LEFT KNEE PAIN, UNSPECIFIED CHRONICITY: ICD-10-CM

## 2024-06-25 PROCEDURE — 97110 THERAPEUTIC EXERCISES: CPT | Mod: GP,CQ

## 2024-06-25 ASSESSMENT — PAIN - FUNCTIONAL ASSESSMENT: PAIN_FUNCTIONAL_ASSESSMENT: 0-10

## 2024-06-25 ASSESSMENT — PAIN SCALES - GENERAL: PAINLEVEL_OUTOF10: 0 - NO PAIN

## 2024-06-25 NOTE — PROGRESS NOTES
"  Physical Therapy Treatment    Patient Name: Domenica Estrada  MRN: [id number]  Today's Date: 6/25/2024  Time Calculation  Start Time: 0956  Stop Time: 1043  Time Calculation (min): 47 min  Current Problem  1. Left knee pain, unspecified chronicity  Follow Up In Physical Therapy          General  Reason for Referral: L knee pain  Referred By: Dr. Wise  Past Medical History Relevant to Rehab: R knee pain due to OA    Subjective   Current Condition:   Better  Patient reports she had seen her orthopedic doctor and he was pleased with her improvement. Pt. Reports no pain in L LE, but her R knee is very painful, but she can not have it replaced until September.    Performing HEP?: Yes    Precautions  Precautions  LE Weight Bearing Status: Weight Bearing as Tolerated  Medical Precautions: Fall precautions  Post-Surgical Precautions: Left total knee precautions  Precautions Comment: L TKR protocol- 05/09/2024 DOS    Pain  Pain Assessment: 0-10  0-10 (Numeric) Pain Score: 0 - No pain  Pain Location: Knee  Pain Orientation: Left (great pain in R knee)    Objective   KNEE  Observation   Pt. Would be walking without cane if her R knee was not deteriorated. Her L TKA is improving rapidly.  Knee AROM   5-130 degrees  Knee MMT   L knee flex 5/5, ext 5/5  Gait   With cane limp on R  Flexibility   Improved L LE    Treatments:    Therapeutic Exercise  Therapeutic Exercise Performed: Yes  Therapeutic Exercise Activity 1: Bike Level 3 Seat 7 x 6'  Therapeutic Exercise Activity 2: L hamstring stretch on step 2 x 30\"  Therapeutic Exercise Activity 3: Gastroc stretch 2 x 30\"  Therapeutic Exercise Activity 4: Step ups with opposite knee drive focus on TKE 6 \" 2x10  Therapeutic Exercise Activity 5: 2x10 mini squats  Therapeutic Exercise Activity 6: prone knee hang w 2# on ankle. 3x30 sec  Therapeutic Exercise Activity 7: L TKE black band 3x10 w hold at end range  Therapeutic Exercise Activity 8: prone knee hang w 2# on ankle. 3x30 " sec  Therapeutic Exercise Activity 9: knee flex stretch on steps. 2x10 w slight hold at end range  Therapeutic Exercise Activity 10: lat. step up 6 inch x 15  Therapeutic Exercise Activity 11: step downs 4 inch L on step 2 x 10  Therapeutic Exercise Activity 12: STS with R fwd x 15    Manual Therapy  Manual Therapy Performed: Yes  Manual Therapy Activity 1: knee ext.mobes to increase ROM    EDUCATION:   Outpatient Education  Individual(s) Educated: Patient  Education Provided: Other (educated on ways to perserve R knee until surgery)  Patient/Caregiver Demonstrated Understanding: yes    Assessment:Pt. Able to tolerate all exercises and stretching on L LE without difficulty and demonstrated good AROM in L knee. Pt. Will get R Knee replaced in September.  PT Assessment  PT Assessment Results: Decreased strength, Decreased range of motion, Decreased endurance, Impaired balance, Decreased mobility  Rehab Prognosis: Excellent    Plan:continue with PT POC, and advance as tolerated. Focus on stretching and strengthening of L LE, and avoid pain to R knee which needs replaced.  OP PT Plan  PT Plan: Skilled PT  PT Frequency: 2 times per week  Duration: 5 weeks  Onset Date: 05/09/24  Certification Period Start Date: 05/28/24  Certification Period End Date: 07/02/24  Number of Treatments Authorized: 8 of 10  Rehab Potential: Excellent  Plan of Care Agreement: Patient    Goals:  Active       PT Problem       Patient will be independent with HEP.  (Progressing)       Start:  05/28/24    Expected End:  06/11/24            Patient will increase L knee extension AROM by at least 5 degrees in order to improve her L TKE during gait. (Progressing)       Start:  05/28/24    Expected End:  07/02/24            Patient will demonstrate 5/5 strength with L knee flexion and extension MMT in order to improve her ability to ambulate in the community and negotiate steps with reciprocal pattern. (Progressing)       Start:  05/28/24    Expected  End:  07/02/24            Patient will report no >/= 2/10 L knee pain with sleeping up to 8 hours to improve her quality of sleep. (Progressing)       Start:  05/28/24    Expected End:  07/02/24            Patient will score >/= 67/80 in order to improve her ability to perform FA's without difficulty. (Progressing)       Start:  05/28/24    Expected End:  07/02/24                 Valarie Sullivan, PTA

## 2024-06-28 ENCOUNTER — TREATMENT (OUTPATIENT)
Dept: PHYSICAL THERAPY | Facility: HOSPITAL | Age: 79
End: 2024-06-28
Payer: MEDICARE

## 2024-06-28 DIAGNOSIS — M25.562 LEFT KNEE PAIN, UNSPECIFIED CHRONICITY: ICD-10-CM

## 2024-06-28 PROCEDURE — 97110 THERAPEUTIC EXERCISES: CPT | Mod: GP

## 2024-06-28 PROCEDURE — 97140 MANUAL THERAPY 1/> REGIONS: CPT | Mod: GP

## 2024-06-28 ASSESSMENT — PAIN - FUNCTIONAL ASSESSMENT: PAIN_FUNCTIONAL_ASSESSMENT: 0-10

## 2024-06-28 ASSESSMENT — PAIN SCALES - GENERAL: PAINLEVEL_OUTOF10: 5 - MODERATE PAIN

## 2024-06-28 NOTE — PROGRESS NOTES
Physical Therapy Treatment    Patient Name: Domenica Estrada  MRN: [id number]  Today's Date: 6/28/2024  Payor: MEDICARE / Plan: MEDICARE PART A AND B / Product Type: *No Product type* /   Time Calculation  Start Time: 0946  Stop Time: 1025  Time Calculation (min): 39 min        PT Therapeutic Procedures Time Entry  Manual Therapy Time Entry: 14  Therapeutic Exercise Time Entry: 25      Current Problem  1. Left knee pain, unspecified chronicity  Follow Up In Physical Therapy        Problem List Items Addressed This Visit             ICD-10-CM       Musculoskeletal and Injuries    Left knee pain M25.562       General  Reason for Referral: L knee pain  Referred By: Dr. Wise  Past Medical History Relevant to Rehab: R knee pain due to OA    Subjective   Current Condition:   Better  Patient reports feeling well. Is optimistic about having next surgery scheduled.  Performing HEP?: Yes    Precautions  Precautions  LE Weight Bearing Status: Weight Bearing as Tolerated  Medical Precautions: Fall precautions  Post-Surgical Precautions: Left total knee precautions  Precautions Comment: L TKR protocol- 05/09/2024 DOS  Pain  Pain Assessment: 0-10  0-10 (Numeric) Pain Score: 5 - Moderate pain  Pain Type: Chronic pain  Pain Location: Knee  Pain Orientation: Right    Objective     L knee: 1-121    Treatments:    Therapeutic Exercise  Therapeutic Exercise Performed: Yes  Therapeutic Exercise Activity 1: Nu step Level 3 x 7'  Therapeutic Exercise Activity 5: 2x10 mini squats  Therapeutic Exercise Activity 6: prone knee hang w 2# on ankle. 3x30 sec  Therapeutic Exercise Activity 7: L TKE black band 3x10 w hold at end range  Therapeutic Exercise Activity 8: prone knee flex w strap 2x30  Therapeutic Exercise Activity 11: step downs 4 inch L on step 2 x 10  Therapeutic Exercise Activity 12: prone TKE w blue bolster 3x10         Manual Therapy  Manual Therapy Performed: Yes  Manual Therapy Activity 1: knee ext grade 3 mobs to increase  ROM  Manual Therapy Activity 2: R knee distraction and gentle PA grade 1-2 mobs, to provide pain relief       EDUCATION:   Individual(s) Educated: Patient  Education Provided: HEP and POC  Handout(s) Provided: Scanned into chart  Home Program: same as previous  Risk and Benefits Discussed with Patient/Caregiver/Other: Yes   Patient/Caregiver Demonstrated Understanding: Yes   Patient Response to Education: Patient/Caregiver verbalized understanding of information, Patient/Caregiver performed return demonstration of exercises/activities, and Patient/Caregiver asked appropriate questions    Assessment:   Pt making good progress towards goals, large improvements in L knee ext this date, though pt cont to have some minor deficits. Pt cont to demo deficits as stated, and requires cont skilled PT intervention to assist pt in returning to PLOF.         Plan: Continue with POC.   OP PT Plan  PT Plan: Skilled PT  PT Frequency: 2 times per week  Duration: 5 weeks  Onset Date: 05/09/24  Certification Period Start Date: 05/28/24  Certification Period End Date: 07/02/24  Number of Treatments Authorized: 9 of 10  Rehab Potential: Excellent  Plan of Care Agreement: Patient    Goals:  Active       PT Problem       Patient will be independent with HEP.  (Progressing)       Start:  05/28/24    Expected End:  06/11/24            Patient will increase L knee extension AROM by at least 5 degrees in order to improve her L TKE during gait. (Progressing)       Start:  05/28/24    Expected End:  07/02/24            Patient will demonstrate 5/5 strength with L knee flexion and extension MMT in order to improve her ability to ambulate in the community and negotiate steps with reciprocal pattern. (Progressing)       Start:  05/28/24    Expected End:  07/02/24            Patient will report no >/= 2/10 L knee pain with sleeping up to 8 hours to improve her quality of sleep. (Progressing)       Start:  05/28/24    Expected End:  07/02/24             Patient will score >/= 67/80 in order to improve her ability to perform FA's without difficulty. (Progressing)       Start:  05/28/24    Expected End:  07/02/24                 Hortensia Perdomo PT

## 2024-07-01 ENCOUNTER — TREATMENT (OUTPATIENT)
Dept: PHYSICAL THERAPY | Facility: HOSPITAL | Age: 79
End: 2024-07-01
Payer: MEDICARE

## 2024-07-01 DIAGNOSIS — M25.562 LEFT KNEE PAIN, UNSPECIFIED CHRONICITY: ICD-10-CM

## 2024-07-01 PROCEDURE — 97110 THERAPEUTIC EXERCISES: CPT | Mod: GP | Performed by: PHYSICAL THERAPIST

## 2024-07-01 ASSESSMENT — PAIN SCALES - GENERAL: PAINLEVEL_OUTOF10: 0 - NO PAIN

## 2024-07-01 ASSESSMENT — PAIN - FUNCTIONAL ASSESSMENT: PAIN_FUNCTIONAL_ASSESSMENT: 0-10

## 2024-07-01 NOTE — PROGRESS NOTES
Physical Therapy Discharge and Treatment    Patient Name: Domenica Estrada  MRN: [id number]  Encounter Date: 7/1/2024  Time Calculation  Start Time: 0830  Stop Time: 0913  Time Calculation (min): 43 min    PT Therapeutic Procedures Time Entry  Manual Therapy Time Entry: 2  Therapeutic Exercise Time Entry: 41      Current Problem  Problem List Items Addressed This Visit             ICD-10-CM    Left knee pain M25.562     1. Left knee pain, unspecified chronicity  Follow Up In Physical Therapy          General  Reason for Referral: L knee pain  Referred By: Dr. Wise  Past Medical History Relevant to Rehab: R knee pain due to OA    Subjective   Current Condition:   Better  Patient reports that she is excited to get her R knee done in September. Her L knee is coming along greater after her surgery and feels ready for discharge today.     Performing HEP?: Yes    Precautions  Precautions  LE Weight Bearing Status: Weight Bearing as Tolerated  Medical Precautions: Fall precautions  Post-Surgical Precautions: Left total knee precautions  Precautions Comment: L TKR protocol- 05/09/2024 DOS  Pain  Pain Assessment: 0-10  0-10 (Numeric) Pain Score: 0 - No pain  Pain Location: Knee  Pain Orientation: Left (6/10 pain in the R knee)    Objective    ROM        Hip AROM (Degrees)                             (R)                    (L)  Flexion:            WFL                  WFL              Knee AROM (Degrees)                             (R)                    (L)  Flexion:            118                   138                                             Extension:        Lacks 18           Lacks 1                                Ankle AROM (Degrees)                             (R)                    (L)  Plantarflexion:  WFL                  WFL                                            Dorsiflexion:     WFL                  WFL                                                                                                          "      Strength Testing        Hip                             (R)                    (L)  Flexion:            4+/5                5/5                                 Extension:        4/5                  4+/5                    Abduction:       4/5                   4+/5                     Adduction:       4/5                   5/5                                      Knee                          (R)                    (L)  Flexion:            4+/5                 5/5                                             Extension:        4+/5                 5/5            Ankle                          (R)                    (L)  Plantarflexion:  5/5                 5/5                                           Dorsiflexion:     5/5                 5/5                                                                                       Functional Screening     Posture: shoulder rounded forward and head forward     Lower Extremity Functional Movements  Transfers: Independent  Gait: antalgicPatient ambulating with increased R/L knee flexion during stance , mild R knee valgus  Assistive Device: no device  Balance  Feet Together: 30 seconds  Tandem: 30 sec. Ea. R/L  SLS: 3 seconds L, unable on the R       Palpation: No significant TTP L knee     Joint Mobility: Decreased mobility/guard with L knee extension      Flexibility: Mod. Restrict. R/L hamstrings and R/L gastrocs.     Observation: L anterior knee incision healing well, no S/S of infection, mild L global knee edema    Outcome Measures:  Other Measures  Lower Extremity Funtional Score (LEFS): 61/80    Treatments:    Therapeutic Exercise  Therapeutic Exercise Performed: Yes  Therapeutic Exercise Activity 1: Bike Level 3 Seat 7 x 6'  Therapeutic Exercise Activity 2: L hamstring stretch on step 2 x 30\"  Therapeutic Exercise Activity 3: Gastroc stretch 2 x 30\"  Therapeutic Exercise Activity 4: Step ups with opposite knee drive focus on TKE 6 \" 2x10  Therapeutic Exercise " Activity 5: Calf raises on step x 20  Therapeutic Exercise Activity 6: BOSU alt. mini lunges x 10 ea.  Therapeutic Exercise Activity 7: step downs 4 inch L on step 2 x 10  Therapeutic Exercise Activity 8: Side steps yellow band at ankles 3 x 8 ft. ea. R/L  Therapeutic Exercise Activity 9: prone TKE w blue bolster 2 x10    Manual Therapy  Manual Therapy Performed: Yes  Manual Therapy Activity 1: knee ext.mobes to increase ROM      EDUCATION:   Individual(s) Educated: Patient  Education Provided: POC/Discharge  Handout(s) Provided: Scanned into chart  Home Program: Continue with HEP  Risk and Benefits Discussed with Patient/Caregiver/Other: Yes   Patient/Caregiver Demonstrated Understanding: Yes   Patient Response to Education: Patient/Caregiver verbalized understanding of information, Patient/Caregiver performed return demonstration of exercises/activities, and Patient/Caregiver asked appropriate questions    Assessment:   The patient has met the majority of PT goals. She is regularly performing her HEP, is ambulating community distances without L knee pain, and is able to stand long periods of time. The patient is most limited at this time due to the R knee. She is getting a R TKR in September 2024. Patient discharged to her home program today's session.       Plan: Discharge to I HEP.  OP PT Plan  Onset Date: 05/09/24  Certification Period Start Date: 05/28/24  Certification Period End Date: 07/02/24  Number of Treatments Authorized: 10 of 10  Rehab Potential: Excellent  Plan of Care Agreement: Patient  Payor: MEDICARE / Plan: MEDICARE PART A AND B / Product Type: *No Product type* /     Goals:  Active       PT Problem       Patient will be independent with HEP.  (Met)       Start:  05/28/24    Expected End:  06/11/24    Resolved:  07/01/24         Patient will increase L knee extension AROM by at least 5 degrees in order to improve her L TKE during gait. (Met)       Start:  05/28/24    Expected End:  07/02/24     Resolved:  07/01/24         Patient will demonstrate 5/5 strength with L knee flexion and extension MMT in order to improve her ability to ambulate in the community and negotiate steps with reciprocal pattern. (Met)       Start:  05/28/24    Expected End:  07/02/24    Resolved:  07/01/24         Patient will report no >/= 2/10 L knee pain with sleeping up to 8 hours to improve her quality of sleep. (Met)       Start:  05/28/24    Expected End:  07/02/24    Resolved:  07/01/24         Patient will score >/= 67/80 in order to improve her ability to perform FA's without difficulty. (Not Progressing)       Start:  05/28/24    Expected End:  07/02/24                 Mechelle Almendarez, PT

## 2024-08-21 DIAGNOSIS — F41.9 ANXIETY: ICD-10-CM

## 2024-08-21 RX ORDER — HYDROXYZINE HYDROCHLORIDE 25 MG/1
TABLET, FILM COATED ORAL
Qty: 90 TABLET | Refills: 1 | Status: SHIPPED | OUTPATIENT
Start: 2024-08-21

## 2024-08-29 ENCOUNTER — PRE-ADMISSION TESTING (OUTPATIENT)
Dept: PREADMISSION TESTING | Facility: HOSPITAL | Age: 79
End: 2024-08-29
Payer: MEDICARE

## 2024-08-29 VITALS
SYSTOLIC BLOOD PRESSURE: 152 MMHG | OXYGEN SATURATION: 100 % | HEART RATE: 81 BPM | DIASTOLIC BLOOD PRESSURE: 89 MMHG | RESPIRATION RATE: 16 BRPM | BODY MASS INDEX: 24.55 KG/M2 | TEMPERATURE: 98.1 F | WEIGHT: 130 LBS | HEIGHT: 61 IN

## 2024-08-29 DIAGNOSIS — Z01.818 PRE-OP TESTING: Primary | ICD-10-CM

## 2024-08-29 DIAGNOSIS — M25.561 CHRONIC PAIN OF RIGHT KNEE: ICD-10-CM

## 2024-08-29 DIAGNOSIS — N28.9 DISORDER OF KIDNEY AND URETER, UNSPECIFIED: ICD-10-CM

## 2024-08-29 DIAGNOSIS — G89.29 CHRONIC PAIN OF RIGHT KNEE: ICD-10-CM

## 2024-08-29 LAB
ANION GAP SERPL CALC-SCNC: 9 MMOL/L
APPEARANCE UR: CLEAR
BASOPHILS # BLD AUTO: 0.03 X10*3/UL (ref 0–0.1)
BASOPHILS NFR BLD AUTO: 0.4 %
BILIRUB UR STRIP.AUTO-MCNC: NEGATIVE MG/DL
BUN SERPL-MCNC: 24 MG/DL (ref 8–25)
CALCIUM SERPL-MCNC: 9.3 MG/DL (ref 8.5–10.4)
CHLORIDE SERPL-SCNC: 101 MMOL/L (ref 97–107)
CO2 SERPL-SCNC: 26 MMOL/L (ref 24–31)
COLOR UR: YELLOW
CREAT SERPL-MCNC: 1 MG/DL (ref 0.4–1.6)
EGFRCR SERPLBLD CKD-EPI 2021: 57 ML/MIN/1.73M*2
EOSINOPHIL # BLD AUTO: 0.08 X10*3/UL (ref 0–0.4)
EOSINOPHIL NFR BLD AUTO: 1.2 %
ERYTHROCYTE [DISTWIDTH] IN BLOOD BY AUTOMATED COUNT: 13.5 % (ref 11.5–14.5)
GLUCOSE SERPL-MCNC: 96 MG/DL (ref 65–99)
GLUCOSE UR STRIP.AUTO-MCNC: NORMAL MG/DL
HCT VFR BLD AUTO: 44.4 % (ref 36–46)
HGB BLD-MCNC: 14.2 G/DL (ref 12–16)
IMM GRANULOCYTES # BLD AUTO: 0.02 X10*3/UL (ref 0–0.5)
IMM GRANULOCYTES NFR BLD AUTO: 0.3 % (ref 0–0.9)
KETONES UR STRIP.AUTO-MCNC: ABNORMAL MG/DL
LEUKOCYTE ESTERASE UR QL STRIP.AUTO: ABNORMAL
LYMPHOCYTES # BLD AUTO: 2.09 X10*3/UL (ref 0.8–3)
LYMPHOCYTES NFR BLD AUTO: 31 %
MCH RBC QN AUTO: 27.5 PG (ref 26–34)
MCHC RBC AUTO-ENTMCNC: 32 G/DL (ref 32–36)
MCV RBC AUTO: 86 FL (ref 80–100)
MONOCYTES # BLD AUTO: 0.53 X10*3/UL (ref 0.05–0.8)
MONOCYTES NFR BLD AUTO: 7.9 %
MUCOUS THREADS #/AREA URNS AUTO: NORMAL /LPF
NEUTROPHILS # BLD AUTO: 4 X10*3/UL (ref 1.6–5.5)
NEUTROPHILS NFR BLD AUTO: 59.2 %
NITRITE UR QL STRIP.AUTO: NEGATIVE
NRBC BLD-RTO: 0 /100 WBCS (ref 0–0)
PH UR STRIP.AUTO: 5.5 [PH]
PLATELET # BLD AUTO: 178 X10*3/UL (ref 150–450)
POTASSIUM SERPL-SCNC: 4.7 MMOL/L (ref 3.4–5.1)
PROT UR STRIP.AUTO-MCNC: ABNORMAL MG/DL
RBC # BLD AUTO: 5.17 X10*6/UL (ref 4–5.2)
RBC # UR STRIP.AUTO: NEGATIVE /UL
RBC #/AREA URNS AUTO: NORMAL /HPF
SODIUM SERPL-SCNC: 136 MMOL/L (ref 133–145)
SP GR UR STRIP.AUTO: 1.03
SQUAMOUS #/AREA URNS AUTO: NORMAL /HPF
UROBILINOGEN UR STRIP.AUTO-MCNC: ABNORMAL MG/DL
WBC # BLD AUTO: 6.8 X10*3/UL (ref 4.4–11.3)
WBC #/AREA URNS AUTO: NORMAL /HPF

## 2024-08-29 PROCEDURE — 36415 COLL VENOUS BLD VENIPUNCTURE: CPT

## 2024-08-29 PROCEDURE — 81001 URINALYSIS AUTO W/SCOPE: CPT | Performed by: PHYSICIAN ASSISTANT

## 2024-08-29 PROCEDURE — 87081 CULTURE SCREEN ONLY: CPT | Mod: TRILAB,WESLAB | Performed by: PHYSICIAN ASSISTANT

## 2024-08-29 PROCEDURE — 87086 URINE CULTURE/COLONY COUNT: CPT | Mod: TRILAB,WESLAB | Performed by: PHYSICIAN ASSISTANT

## 2024-08-29 PROCEDURE — 82374 ASSAY BLOOD CARBON DIOXIDE: CPT | Performed by: PHYSICIAN ASSISTANT

## 2024-08-29 PROCEDURE — 85025 COMPLETE CBC W/AUTO DIFF WBC: CPT | Performed by: PHYSICIAN ASSISTANT

## 2024-08-29 PROCEDURE — 99204 OFFICE O/P NEW MOD 45 MIN: CPT | Performed by: PHYSICIAN ASSISTANT

## 2024-08-29 RX ORDER — NAPROXEN 250 MG/1
250 TABLET ORAL 2 TIMES DAILY PRN
COMMUNITY

## 2024-08-29 RX ORDER — CHLORHEXIDINE GLUCONATE ORAL RINSE 1.2 MG/ML
SOLUTION DENTAL
Qty: 473 ML | Refills: 0 | Status: SHIPPED | OUTPATIENT
Start: 2024-08-29 | End: 2024-09-12

## 2024-08-29 ASSESSMENT — DUKE ACTIVITY SCORE INDEX (DASI)
CAN YOU HAVE SEXUAL RELATIONS: YES
CAN YOU PARTICIPATE IN MODERATE RECREATIONAL ACTIVITIES LIKE GOLF, BOWLING, DANCING, DOUBLES TENNIS OR THROWING A BASEBALL OR FOOTBALL: NO
CAN YOU DO MODERATE WORK AROUND THE HOUSE LIKE VACUUMING, SWEEPING FLOORS OR CARRYING GROCERIES: YES
CAN YOU RUN A SHORT DISTANCE: NO
CAN YOU PARTICIPATE IN STRENOUS SPORTS LIKE SWIMMING, SINGLES TENNIS, FOOTBALL, BASKETBALL, OR SKIING: NO
CAN YOU TAKE CARE OF YOURSELF (EAT, DRESS, BATHE, OR USE TOILET): YES
CAN YOU WALK INDOORS, SUCH AS AROUND YOUR HOUSE: YES
DASI METS SCORE: 6.3
TOTAL_SCORE: 28.7
CAN YOU DO LIGHT WORK AROUND THE HOUSE LIKE DUSTING OR WASHING DISHES: YES
CAN YOU DO YARD WORK LIKE RAKING LEAVES, WEEDING OR PUSHING A MOWER: YES
CAN YOU WALK A BLOCK OR TWO ON LEVEL GROUND: YES
CAN YOU CLIMB A FLIGHT OF STAIRS OR WALK UP A HILL: YES
CAN YOU DO HEAVY WORK AROUND THE HOUSE LIKE SCRUBBING FLOORS OR LIFTING AND MOVING HEAVY FURNITURE: NO

## 2024-08-29 ASSESSMENT — ENCOUNTER SYMPTOMS
ARTHRALGIAS: 1
GASTROINTESTINAL NEGATIVE: 1
EYES NEGATIVE: 1
CARDIOVASCULAR NEGATIVE: 1
RESPIRATORY NEGATIVE: 1
CONSTITUTIONAL NEGATIVE: 1
NEUROLOGICAL NEGATIVE: 1
PSYCHIATRIC NEGATIVE: 1

## 2024-08-29 NOTE — PREPROCEDURE INSTRUCTIONS
Why must I stop eating and drinking near surgery time?  With sedation, food or liquid in your stomach can enter your lungs causing serious complications  Increases nausea and vomiting    When do I need to stop eating and drinking before my surgery?   Do not eat or drink after midnight the night before your surgery/procedure.  You may have small sips of water to take your medication.    PAT DISCHARGE INSTRUCTIONS    Please call the Same Day Surgery (SDS) Department of the hospital where your procedure will be performed after 2:00 PM the day before your surgery. If you are scheduled on a Monday, or a Tuesday following a Monday holiday, you will need to call on the last business day prior to your surgery.    [place] [id number] [place] [id number] [phone]  Second Floor      Please let your surgeon know if:      You develop any open sores, shingles, burning or painful urination as these may increase your risk of an infection.   You no longer wish to have the surgery.   Any other personal circumstances change that may lead to the need to cancel or defer this surgery-such as being sick or getting admitted to any hospital within one week of your planned procedure.    Your contact details change, such as a change of address or phone number.    Starting now:     Please DO NOT drink alcohol or smoke for 24 hours before surgery. It is well known that quitting smoking can make a huge difference to your health and recovery from surgery. The longer you abstain from smoking, the better your chances of a healthy recovery. If you need help with quitting, call 5-800-QUIT-NOW to be connected to a trained counselor who will discuss the best methods to help you quit.     Before your surgery:    Please stop all supplements 7 days prior to surgery. Or as directed by your surgeon.   Please stop taking NSAID pain medicine such as Advil and Motrin 7 days before surgery.    If you  develop any fever, cough, cold, rashes, cuts, scratches, scrapes, urinary symptoms or infection anywhere on your body (including teeth and gums) prior to surgery, please call your surgeon’s office as soon as possible. This may require treatment to reduce the chance of cancellation on the day of surgery.    The day before your surgery:   DIET- Please follow the diet instructions at the top of your packet.   Get a good night’s rest.  Use the special soap for bathing if you have been instructed to use one.    Scheduled surgery times may change and you will be notified if this occurs - please check your personal voicemail for any updates.     On the morning of surgery:   Wear comfortable, loose fitting clothes which open in the front. Please do not wear moisturizers, creams, lotions, makeup or perfume.    Please bring with you to surgery:   Photo ID and insurance card   Current list of medicines and allergies   Pacemaker/ Defibrillator/Heart stent cards   CPAP machine and mask    Slings/ splints/ crutches   A copy of your complete advanced directive/DHPOA.    Please do NOT bring with you to surgery:   All jewelry and valuables should be left at home.   Prosthetic devices such as contact lenses, hearing aids, dentures, eyelash extensions, hairpins and body piercings must be removed prior to going in to the surgical suite.    After outpatient surgery:   A responsible adult MUST accompany you at the time of discharge and stay with you for 24 hours after your surgery. You may NOT drive yourself home after surgery.    Do not drive, operate machinery, make critical decisions or do activities that require co-ordination or balance until after a night’s sleep.   Do not drink alcoholic beverages for 24 hours.   Instructions for resuming your medications will be provided by your surgeon.    CALL YOUR DOCTOR AFTER SURGERY IF YOU HAVE:     Chills and/or a fever of 101° F or higher.    Redness, swelling, pus or drainage from your  surgical wound or a bad smell from the wound.    Lightheadedness, fainting or confusion.    Persistent vomiting (throwing up) and are not able to eat or drink for 12 hours.    Three or more loose, watery bowel movements in 24 hours (diarrhea).   Difficulty or pain while urinating( after non-urological surgery)    Pain and swelling in your legs, especially if it is only on one side.    Difficulty breathing or are breathing faster than normal.    Any new concerning symptoms.        Patient Information: Pre-Operative Infection Prevention Measures     Why did I have my nose, under my arms, and groin swabbed?  The purpose of the swab is to identify Staphylococcus aureus inside your nose or on your skin.  The swab was sent to the laboratory for culture.  A positive swab/culture for Staphylococcus aureus is called colonization or carriage.      What is Staphylococcus aureus?  Staphylococcus aureus, also known as “staph”, is a germ found on the skin or in the nose of healthy people.  Sometimes Staphylococcus aureus can get into the body and cause an infection.  This can be minor (such as pimples, boils, or other skin problems).  It might also be serious (such as a blood infection, pneumonia, or a surgical site infection).    What is Staphylococcus aureus colonization or carriage?  Colonization or carriage means that a person has the germ but is not sick from it.  These bacteria can be spread on the hands or when breathing or sneezing.    How is Staphylococcus aureus spread?  It is most often spread by close contact with a person or item that carries it.    What happens if my culture is positive for Staphylococcus aureus?  Your doctor/medical team will use this information to guide any antibiotic treatment which may be necessary.  Regardless of the culture results, we will clean the inside of your nose with a betadine swab just before you have your surgery.      Will I get an infection if I have Staphylococcus aureus in my  nose or on my skin?  Anyone can get an infection with Staphylococcus aureus.  However, the best way to reduce your risk of infection is to follow the instructions provided to you for the use of your CHG soap and dental rinse.        Patient Information: Oral/Dental Rinse    What is oral/dental rinse?   It is a mouthwash. It is a way of cleaning the mouth with a germ-killing solution before your surgery.  The solution contains chlorhexidine, commonly known as CHG.   It is used inside the mouth to kill a bacteria known as Staphylococcus aureus.  Let your doctor know if you are allergic to Chlorhexidine.    Why do I need to use CHG oral/dental rinse?  The CHG oral/dental rinse helps to kill a bacteria in your mouth known as Staphylococcus aureus.     This reduces the risk of infection at the surgical site.      Using your CHG oral/dental rinse  STEPS:  Use your CHG oral/dental rinse after you brush your teeth the night before (at bedtime) and the morning of your surgery.  Follow all directions on your prescription label.    Use the cap on the container to measure 15ml   Swish (gargle if you can) the mouthwash in your mouth for at least 30 seconds, (do not swallow) and spit out  After you use your CHG rinse, do not rinse your mouth with water, drink or eat.  Please refer to the prescription label for the appropriate time to resume oral intake      What side effects might I have using the CHG oral/dental rinse?  CHG rinse will stick to plaque on the teeth.  Brush and floss just before use.  Teeth brushing will help avoid staining of plaque during use.      Patient Information: Home Preoperative Antibacterial Shower      What is a home preoperative antibacterial shower?  This shower is a way of cleaning the skin with a germ-killing solution before surgery.  The solution contains chlorhexidine, commonly known as CHG.  CHG is a skin cleanser with germ-killing ability.  Let your doctor know if you are allergic to  chlorhexidine.    Why do I need to take a preoperative antibacterial shower?  Skin is not sterile.  It is best to try to make your skin as free of germs as possible before surgery.  Proper cleansing with a germ-killing soap before surgery can lower the number of germs on your skin.  This helps to reduce the risk of infection at the surgical site.  Following the instructions listed below will help you prepare your skin for surgery.      How do I use the solution?  Steps:  Begin using your CHG soap 5 days before your scheduled surgery on ________________________.    First, wash and rinse your hair using the CHG soap. Keep CHG soap away from ear canals and eyes.  Rinse completely, do not condition.  Hair extensions should be removed.  Wash your face with your normal soap and rinse.    Apply the CHG solution to a clean wet washcloth.  Turn the water off or move away from the water spray to avoid premature rinsing of the CHG soap as you are applying.   Firmly lather your entire body from the neck down.  Do not use on your face.  Pay special attention to the area(s) where your incision(s) will be located unless they are on your face.  Avoid scrubbing your skin too hard.  The important point is to have the CHG soap sit on your skin for 3 minutes.    When the 3 minutes are up, turn on the water and rinse the CHG solution off your body completely.   DO NOT wash with regular soap after you have used the CHG soap solution  Pat yourself dry with a clean, freshly-laundered towel.  DO NOT apply powders, deodorants, or lotions.  Dress in clean, freshly laundered nightclothes.    Be sure to sleep with clean, freshly laundered sheets.  Be aware that CHG will cause stains on fabrics; if you wash them with bleach after use.  Rinse your washcloth and other linens that have contact with CHG completely.  Use only non-chlorine detergents to launder the items used.   The morning of surgery is the fifth day.  Repeat the above steps and  dress in clean comfortable clothing     Whom should I contact if I have any questions regarding the use of CHG soap?  Call the [facility] at [phone] if you have any questions.                 Preoperative Brain Exercises    What are brain exercises?  A brain exercise is any activity that engages your thinking (cognitive) skills.    What types of activities are considered brain exercises?  Jigsaw puzzles, crossword puzzles, word jumble, memory games, word search, and many more.  Many can be found free online or on your phone via a mobile krish.    Why should I do brain exercises before my surgery?  More recent research has shown brain exercise before surgery can lower the risk of postoperative delirium (confusion) which can be especially important for older adults.  Patients who did brain exercises for 5 to 10 hours the days before surgery, cut their risk of postoperative delirium in half up to 1 week after surgery.         Medication List            Accurate as of August 29, 2024 12:19 PM. Always use your most recent med list.                chlorhexidine 0.12 % solution  Commonly known as: Peridex  Use as directed.     hydrOXYzine HCL 25 mg tablet  Commonly known as: Atarax  1 TABLET AS NEEDED DAILY FOR ANXIETY  Notes to patient: Hold dose day of surgery.     naproxen 250 mg tablet  Commonly known as: Naprosyn  Medication Adjustments for Surgery: Stop 7 days before surgery     NON FORMULARY  Medication Adjustments for Surgery: Stop 7 days before surgery  Notes to patient: STOP ALL SUPPLEMENTS FOR SURGERY

## 2024-08-29 NOTE — CPM/PAT H&P
"CPM/PAT Evaluation       Name: Domenica Estrada (Domenica Estrada)  /Age: 1945/78 y.o.     In-Person       Chief Complaint: \"knee pain\"    HPI  The patient is an 78 year old female.  For the past 4 years she has experienced gradual, but progressive, non-radiating right knee pain with walking and climbing stairs.  She has associated instability, but no swelling, numbness, tingling, weakness or falls.  The pain resolves with rest and the frequency depends on her activities.  She is using a cane as needed.  She has been followed by Dr. Wise and a right total knee replacement is recommended at this time.    Past Medical History:   Diagnosis Date    Anxiety     Arthritis     GERD (gastroesophageal reflux disease)     HLD (hyperlipidemia)     Osteoarthritis     Osteopenia        Past Surgical History:   Procedure Laterality Date    APPENDECTOMY      CHOLECYSTECTOMY      COLONOSCOPY      KNEE ARTHROPLASTY Left 2024     Social History     Tobacco Use    Smoking status: Never    Smokeless tobacco: Never   Substance Use Topics    Alcohol use: Not Currently     Comment: OCC WINE     Social History     Substance and Sexual Activity   Drug Use Never     Allergies   Allergen Reactions    Mobic [Meloxicam] GI Upset     GASTRITIS     Current Outpatient Medications   Medication Sig Dispense Refill    chlorhexidine (Peridex) 0.12 % solution Use as directed. 473 mL 0    hydrOXYzine HCL (Atarax) 25 mg tablet 1 TABLET AS NEEDED DAILY FOR ANXIETY 90 tablet 1    naproxen (Naprosyn) 250 mg tablet Take 1 tablet (250 mg) by mouth 2 times a day as needed for mild pain (1 - 3).      NON FORMULARY Take 1 each by mouth once daily. HEMP OIL COMPLEX DAILY  OKRA PEPSIN E3 DAILY  PROSYNBIOTIC DAILY  ZYMEX DAILY       No current facility-administered medications for this visit.     Review of Systems   Constitutional: Negative.    HENT: Negative.     Eyes: Negative.    Respiratory: Negative.     Cardiovascular: Negative.  " "  Gastrointestinal: Negative.    Genitourinary: Negative.    Musculoskeletal:  Positive for arthralgias.   Neurological: Negative.    Psychiatric/Behavioral: Negative.       /89   Pulse 81   Temp 36.7 °C (98.1 °F) (Temporal)   Resp 16   Ht 1.549 m (5' 1\")   Wt 59 kg (130 lb)   LMP  (LMP Unknown)   SpO2 100%   BMI 24.56 kg/m²     Physical Exam  Vitals reviewed.   Constitutional:       Appearance: Normal appearance.   HENT:      Head: Normocephalic and atraumatic.      Mouth/Throat:      Mouth: Mucous membranes are moist.      Pharynx: Oropharynx is clear.   Eyes:      Extraocular Movements: Extraocular movements intact.      Pupils: Pupils are equal, round, and reactive to light.      Comments: Glasses     Cardiovascular:      Rate and Rhythm: Normal rate and regular rhythm.      Heart sounds: Normal heart sounds.   Pulmonary:      Effort: Pulmonary effort is normal.      Breath sounds: Normal breath sounds.   Abdominal:      General: Bowel sounds are normal.      Palpations: Abdomen is soft.   Musculoskeletal:      Comments: Tenderness with palpation right knee.  Limited range of motion right knee, no crepitus noted on exam today.   Skin:     General: Skin is warm and dry.   Neurological:      General: No focal deficit present.      Mental Status: She is alert and oriented to person, place, and time.      Comments: Mild head tremor noted.   Psychiatric:         Mood and Affect: Mood normal.         Behavior: Behavior normal.        PAT AIRWAY:   Airway:     Mallampati::  IV    TM distance::  >3 FB    Neck ROM::  Full   Teeth intact    ASA:  II  DASI SCORE:  28.7  METS SCORE:  6.3  CHAD2 SCORE:  1.9%  REVISED CARDIAC RISK INDEX:  0.4%  STOP BANG SCORE:  1    EKG 4/24/2024 - normal sinus rhythm  CBC, BMP, U/A, MRSA ordered during PAT visit    Assessment and Plan:     Chronic pain of right knee:  Right total knee replacement  GERD - currently no medication required    Kelle Henry PA-C          "

## 2024-08-31 LAB
BACTERIA UR CULT: NO GROWTH
STAPHYLOCOCCUS SPEC CULT: NORMAL

## 2024-09-11 PROBLEM — Z96.651 S/P TOTAL KNEE ARTHROPLASTY, RIGHT: Status: ACTIVE | Noted: 2024-09-11

## 2024-09-12 ENCOUNTER — ANESTHESIA (OUTPATIENT)
Dept: OPERATING ROOM | Facility: HOSPITAL | Age: 79
End: 2024-09-12
Payer: MEDICARE

## 2024-09-12 ENCOUNTER — DOCUMENTATION (OUTPATIENT)
Dept: HOME HEALTH SERVICES | Facility: HOME HEALTH | Age: 79
End: 2024-09-12

## 2024-09-12 ENCOUNTER — APPOINTMENT (OUTPATIENT)
Dept: RADIOLOGY | Facility: HOSPITAL | Age: 79
End: 2024-09-12
Payer: MEDICARE

## 2024-09-12 ENCOUNTER — PHARMACY VISIT (OUTPATIENT)
Dept: PHARMACY | Facility: CLINIC | Age: 79
End: 2024-09-12
Payer: MEDICARE

## 2024-09-12 ENCOUNTER — HOME HEALTH ADMISSION (OUTPATIENT)
Dept: HOME HEALTH SERVICES | Facility: HOME HEALTH | Age: 79
End: 2024-09-12
Payer: MEDICARE

## 2024-09-12 ENCOUNTER — ANESTHESIA EVENT (OUTPATIENT)
Dept: OPERATING ROOM | Facility: HOSPITAL | Age: 79
End: 2024-09-12
Payer: MEDICARE

## 2024-09-12 ENCOUNTER — HOSPITAL ENCOUNTER (OUTPATIENT)
Facility: HOSPITAL | Age: 79
Setting detail: OUTPATIENT SURGERY
Discharge: HOME HEALTH | End: 2024-09-12
Attending: ORTHOPAEDIC SURGERY | Admitting: ORTHOPAEDIC SURGERY
Payer: MEDICARE

## 2024-09-12 VITALS
OXYGEN SATURATION: 98 % | DIASTOLIC BLOOD PRESSURE: 83 MMHG | BODY MASS INDEX: 24.55 KG/M2 | RESPIRATION RATE: 17 BRPM | SYSTOLIC BLOOD PRESSURE: 149 MMHG | TEMPERATURE: 96.8 F | HEIGHT: 61 IN | WEIGHT: 130 LBS | HEART RATE: 85 BPM

## 2024-09-12 DIAGNOSIS — M25.561 CHRONIC PAIN OF RIGHT KNEE: Primary | ICD-10-CM

## 2024-09-12 DIAGNOSIS — G89.29 CHRONIC PAIN OF RIGHT KNEE: Primary | ICD-10-CM

## 2024-09-12 PROBLEM — E78.5 HYPERLIPIDEMIA: Status: ACTIVE | Noted: 2024-09-12

## 2024-09-12 PROBLEM — F41.9 ANXIETY: Status: ACTIVE | Noted: 2024-09-12

## 2024-09-12 PROBLEM — M19.90 OSTEOARTHRITIS: Status: ACTIVE | Noted: 2024-09-12

## 2024-09-12 PROBLEM — K21.9 GASTROESOPHAGEAL REFLUX DISEASE: Status: ACTIVE | Noted: 2024-09-12

## 2024-09-12 PROCEDURE — 2720000007 HC OR 272 NO HCPCS: Performed by: ORTHOPAEDIC SURGERY

## 2024-09-12 PROCEDURE — 2500000004 HC RX 250 GENERAL PHARMACY W/ HCPCS (ALT 636 FOR OP/ED)

## 2024-09-12 PROCEDURE — 73560 X-RAY EXAM OF KNEE 1 OR 2: CPT | Mod: RT

## 2024-09-12 PROCEDURE — A27447 PR TOTAL KNEE ARTHROPLASTY: Performed by: ANESTHESIOLOGY

## 2024-09-12 PROCEDURE — 7100000009 HC PHASE TWO TIME - INITIAL BASE CHARGE: Performed by: ORTHOPAEDIC SURGERY

## 2024-09-12 PROCEDURE — C1713 ANCHOR/SCREW BN/BN,TIS/BN: HCPCS | Performed by: ORTHOPAEDIC SURGERY

## 2024-09-12 PROCEDURE — 97161 PT EVAL LOW COMPLEX 20 MIN: CPT | Mod: GP

## 2024-09-12 PROCEDURE — C1776 JOINT DEVICE (IMPLANTABLE): HCPCS | Performed by: ORTHOPAEDIC SURGERY

## 2024-09-12 PROCEDURE — 2500000004 HC RX 250 GENERAL PHARMACY W/ HCPCS (ALT 636 FOR OP/ED): Mod: JZ | Performed by: ORTHOPAEDIC SURGERY

## 2024-09-12 PROCEDURE — 2500000004 HC RX 250 GENERAL PHARMACY W/ HCPCS (ALT 636 FOR OP/ED): Performed by: ANESTHESIOLOGY

## 2024-09-12 PROCEDURE — 2500000005 HC RX 250 GENERAL PHARMACY W/O HCPCS: Performed by: ANESTHESIOLOGY

## 2024-09-12 PROCEDURE — 2500000005 HC RX 250 GENERAL PHARMACY W/O HCPCS: Performed by: ORTHOPAEDIC SURGERY

## 2024-09-12 PROCEDURE — 3600000010 HC OR TIME - EACH INCREMENTAL 1 MINUTE - PROCEDURE LEVEL FIVE: Performed by: ORTHOPAEDIC SURGERY

## 2024-09-12 PROCEDURE — 27447 TOTAL KNEE ARTHROPLASTY: CPT | Performed by: ORTHOPAEDIC SURGERY

## 2024-09-12 PROCEDURE — 97116 GAIT TRAINING THERAPY: CPT | Mod: GP

## 2024-09-12 PROCEDURE — 3600000005 HC OR TIME - INITIAL BASE CHARGE - PROCEDURE LEVEL FIVE: Performed by: ORTHOPAEDIC SURGERY

## 2024-09-12 PROCEDURE — 3700000001 HC GENERAL ANESTHESIA TIME - INITIAL BASE CHARGE: Performed by: ORTHOPAEDIC SURGERY

## 2024-09-12 PROCEDURE — 7100000010 HC PHASE TWO TIME - EACH INCREMENTAL 1 MINUTE: Performed by: ORTHOPAEDIC SURGERY

## 2024-09-12 PROCEDURE — 3700000002 HC GENERAL ANESTHESIA TIME - EACH INCREMENTAL 1 MINUTE: Performed by: ORTHOPAEDIC SURGERY

## 2024-09-12 PROCEDURE — 73560 X-RAY EXAM OF KNEE 1 OR 2: CPT | Mod: RIGHT SIDE | Performed by: RADIOLOGY

## 2024-09-12 PROCEDURE — 96372 THER/PROPH/DIAG INJ SC/IM: CPT | Performed by: ORTHOPAEDIC SURGERY

## 2024-09-12 PROCEDURE — 2780000003 HC OR 278 NO HCPCS: Performed by: ORTHOPAEDIC SURGERY

## 2024-09-12 PROCEDURE — 2500000004 HC RX 250 GENERAL PHARMACY W/ HCPCS (ALT 636 FOR OP/ED): Performed by: ORTHOPAEDIC SURGERY

## 2024-09-12 PROCEDURE — A4649 SURGICAL SUPPLIES: HCPCS | Performed by: ORTHOPAEDIC SURGERY

## 2024-09-12 PROCEDURE — 88304 TISSUE EXAM BY PATHOLOGIST: CPT | Mod: TC | Performed by: ORTHOPAEDIC SURGERY

## 2024-09-12 PROCEDURE — 7100000011 HC EXTENDED STAY RECOVERY HOURLY - NURSING UNIT

## 2024-09-12 PROCEDURE — 7100000001 HC RECOVERY ROOM TIME - INITIAL BASE CHARGE: Performed by: ORTHOPAEDIC SURGERY

## 2024-09-12 PROCEDURE — 99100 ANES PT EXTEME AGE<1 YR&>70: CPT | Performed by: ANESTHESIOLOGY

## 2024-09-12 PROCEDURE — RXMED WILLOW AMBULATORY MEDICATION CHARGE

## 2024-09-12 PROCEDURE — 7100000002 HC RECOVERY ROOM TIME - EACH INCREMENTAL 1 MINUTE: Performed by: ORTHOPAEDIC SURGERY

## 2024-09-12 DEVICE — ATTUNE PATELLA MEDIALIZED DOME 35MM CEMENTED AOX
Type: IMPLANTABLE DEVICE | Site: KNEE | Status: FUNCTIONAL
Brand: ATTUNE

## 2024-09-12 DEVICE — ATTUNE KNEE SYSTEM FEMORAL POSTERIOR STABILIZED SIZE 4 RIGHT CEMENTED
Type: IMPLANTABLE DEVICE | Site: KNEE | Status: FUNCTIONAL
Brand: ATTUNE

## 2024-09-12 DEVICE — ATTUNE KNEE SYSTEM TIBIAL BASE ROTATING PLATFORM SIZE 4 CEMENTED
Type: IMPLANTABLE DEVICE | Site: KNEE | Status: FUNCTIONAL
Brand: ATTUNE

## 2024-09-12 DEVICE — ATTUNE KNEE SYSTEM TIBIAL INSERT ROTATING PLATFORM POSTERIOR STABILIZED 4 5MM AOX
Type: IMPLANTABLE DEVICE | Site: KNEE | Status: FUNCTIONAL
Brand: ATTUNE

## 2024-09-12 DEVICE — FULL DOSE BONE CEMENT, 10 PACK CATALOG NUMBER IS 6191-1-010
Type: IMPLANTABLE DEVICE | Site: KNEE | Status: FUNCTIONAL
Brand: SIMPLEX

## 2024-09-12 RX ORDER — NAPROXEN 500 MG/1
500 TABLET ORAL 2 TIMES DAILY
Qty: 28 TABLET | Refills: 0 | Status: SHIPPED | OUTPATIENT
Start: 2024-09-12

## 2024-09-12 RX ORDER — CEFAZOLIN SODIUM 2 G/100ML
2 INJECTION, SOLUTION INTRAVENOUS EVERY 8 HOURS
Status: DISCONTINUED | OUTPATIENT
Start: 2024-09-12 | End: 2024-09-12 | Stop reason: HOSPADM

## 2024-09-12 RX ORDER — ASPIRIN 81 MG/1
81 TABLET ORAL 2 TIMES DAILY
Qty: 60 TABLET | Refills: 0 | Status: SHIPPED | OUTPATIENT
Start: 2024-09-12

## 2024-09-12 RX ORDER — ROPIVACAINE HYDROCHLORIDE 5 MG/ML
INJECTION, SOLUTION EPIDURAL; INFILTRATION; PERINEURAL AS NEEDED
Status: DISCONTINUED | OUTPATIENT
Start: 2024-09-12 | End: 2024-09-12

## 2024-09-12 RX ORDER — CELECOXIB 200 MG/1
400 CAPSULE ORAL ONCE
Status: DISCONTINUED | OUTPATIENT
Start: 2024-09-12 | End: 2024-09-12 | Stop reason: HOSPADM

## 2024-09-12 RX ORDER — DOCUSATE SODIUM 100 MG/1
100 CAPSULE, LIQUID FILLED ORAL 2 TIMES DAILY PRN
Qty: 25 CAPSULE | Refills: 0 | Status: SHIPPED | OUTPATIENT
Start: 2024-09-12

## 2024-09-12 RX ORDER — ONDANSETRON 4 MG/1
4 TABLET, FILM COATED ORAL EVERY 8 HOURS PRN
Qty: 20 TABLET | Refills: 0 | Status: SHIPPED | OUTPATIENT
Start: 2024-09-12

## 2024-09-12 RX ORDER — DOCUSATE SODIUM 100 MG/1
100 CAPSULE, LIQUID FILLED ORAL 2 TIMES DAILY
Status: DISCONTINUED | OUTPATIENT
Start: 2024-09-12 | End: 2024-09-12 | Stop reason: HOSPADM

## 2024-09-12 RX ORDER — POLYETHYLENE GLYCOL 3350 17 G/17G
17 POWDER, FOR SOLUTION ORAL DAILY
Status: DISCONTINUED | OUTPATIENT
Start: 2024-09-12 | End: 2024-09-12 | Stop reason: HOSPADM

## 2024-09-12 RX ORDER — ASPIRIN 81 MG/1
81 TABLET ORAL 2 TIMES DAILY
Status: DISCONTINUED | OUTPATIENT
Start: 2024-09-12 | End: 2024-09-12 | Stop reason: HOSPADM

## 2024-09-12 RX ORDER — OXYCODONE AND ACETAMINOPHEN 5; 325 MG/1; MG/1
0.5 TABLET ORAL EVERY 4 HOURS PRN
Status: DISCONTINUED | OUTPATIENT
Start: 2024-09-12 | End: 2024-09-12 | Stop reason: HOSPADM

## 2024-09-12 RX ORDER — SODIUM CHLORIDE, SODIUM LACTATE, POTASSIUM CHLORIDE, CALCIUM CHLORIDE 600; 310; 30; 20 MG/100ML; MG/100ML; MG/100ML; MG/100ML
100 INJECTION, SOLUTION INTRAVENOUS CONTINUOUS
Status: DISCONTINUED | OUTPATIENT
Start: 2024-09-12 | End: 2024-09-12 | Stop reason: HOSPADM

## 2024-09-12 RX ORDER — CYCLOBENZAPRINE HCL 10 MG
10 TABLET ORAL 3 TIMES DAILY PRN
Status: DISCONTINUED | OUTPATIENT
Start: 2024-09-12 | End: 2024-09-12 | Stop reason: HOSPADM

## 2024-09-12 RX ORDER — SODIUM CHLORIDE 9 MG/ML
INJECTION, SOLUTION INTRAMUSCULAR; INTRAVENOUS; SUBCUTANEOUS AS NEEDED
Status: DISCONTINUED | OUTPATIENT
Start: 2024-09-12 | End: 2024-09-12 | Stop reason: HOSPADM

## 2024-09-12 RX ORDER — MIDAZOLAM HYDROCHLORIDE 1 MG/ML
2 INJECTION, SOLUTION INTRAMUSCULAR; INTRAVENOUS ONCE
Status: COMPLETED | OUTPATIENT
Start: 2024-09-12 | End: 2024-09-12

## 2024-09-12 RX ORDER — SODIUM CHLORIDE 9 MG/ML
100 INJECTION, SOLUTION INTRAVENOUS CONTINUOUS
Status: DISCONTINUED | OUTPATIENT
Start: 2024-09-12 | End: 2024-09-12 | Stop reason: HOSPADM

## 2024-09-12 RX ORDER — NALOXONE HYDROCHLORIDE 0.4 MG/ML
0.2 INJECTION, SOLUTION INTRAMUSCULAR; INTRAVENOUS; SUBCUTANEOUS EVERY 5 MIN PRN
Status: DISCONTINUED | OUTPATIENT
Start: 2024-09-12 | End: 2024-09-12 | Stop reason: HOSPADM

## 2024-09-12 RX ORDER — KETOROLAC TROMETHAMINE 30 MG/ML
INJECTION, SOLUTION INTRAMUSCULAR; INTRAVENOUS AS NEEDED
Status: DISCONTINUED | OUTPATIENT
Start: 2024-09-12 | End: 2024-09-12 | Stop reason: HOSPADM

## 2024-09-12 RX ORDER — ROPIVACAINE HYDROCHLORIDE 5 MG/ML
INJECTION, SOLUTION EPIDURAL; INFILTRATION; PERINEURAL AS NEEDED
Status: DISCONTINUED | OUTPATIENT
Start: 2024-09-12 | End: 2024-09-12 | Stop reason: HOSPADM

## 2024-09-12 RX ORDER — KETOROLAC TROMETHAMINE 30 MG/ML
15 INJECTION, SOLUTION INTRAMUSCULAR; INTRAVENOUS EVERY 6 HOURS
Status: DISCONTINUED | OUTPATIENT
Start: 2024-09-12 | End: 2024-09-12 | Stop reason: HOSPADM

## 2024-09-12 RX ORDER — SODIUM CHLORIDE, SODIUM LACTATE, POTASSIUM CHLORIDE, CALCIUM CHLORIDE 600; 310; 30; 20 MG/100ML; MG/100ML; MG/100ML; MG/100ML
50 INJECTION, SOLUTION INTRAVENOUS CONTINUOUS
Status: DISCONTINUED | OUTPATIENT
Start: 2024-09-12 | End: 2024-09-12 | Stop reason: HOSPADM

## 2024-09-12 RX ORDER — FENTANYL CITRATE 50 UG/ML
50 INJECTION, SOLUTION INTRAMUSCULAR; INTRAVENOUS EVERY 5 MIN PRN
Status: DISCONTINUED | OUTPATIENT
Start: 2024-09-12 | End: 2024-09-12 | Stop reason: HOSPADM

## 2024-09-12 RX ORDER — OXYCODONE AND ACETAMINOPHEN 5; 325 MG/1; MG/1
1 TABLET ORAL EVERY 4 HOURS PRN
Qty: 36 TABLET | Refills: 0 | Status: SHIPPED | OUTPATIENT
Start: 2024-09-12 | End: 2024-09-18

## 2024-09-12 RX ORDER — OXYCODONE HYDROCHLORIDE 5 MG/1
5 TABLET ORAL EVERY 6 HOURS PRN
Status: DISCONTINUED | OUTPATIENT
Start: 2024-09-12 | End: 2024-09-12 | Stop reason: HOSPADM

## 2024-09-12 RX ORDER — ONDANSETRON 4 MG/1
4 TABLET, ORALLY DISINTEGRATING ORAL EVERY 8 HOURS PRN
Status: DISCONTINUED | OUTPATIENT
Start: 2024-09-12 | End: 2024-09-12 | Stop reason: HOSPADM

## 2024-09-12 RX ORDER — EPINEPHRINE 1 MG/ML
INJECTION INTRAMUSCULAR; INTRAVENOUS; SUBCUTANEOUS AS NEEDED
Status: DISCONTINUED | OUTPATIENT
Start: 2024-09-12 | End: 2024-09-12 | Stop reason: HOSPADM

## 2024-09-12 RX ORDER — MORPHINE SULFATE 10 MG/ML
INJECTION, SOLUTION INTRAMUSCULAR; INTRAVENOUS AS NEEDED
Status: DISCONTINUED | OUTPATIENT
Start: 2024-09-12 | End: 2024-09-12

## 2024-09-12 RX ORDER — OXYCODONE AND ACETAMINOPHEN 10; 325 MG/1; MG/1
1 TABLET ORAL EVERY 4 HOURS PRN
Status: DISCONTINUED | OUTPATIENT
Start: 2024-09-12 | End: 2024-09-12 | Stop reason: HOSPADM

## 2024-09-12 RX ORDER — ACETAMINOPHEN 325 MG/1
650 TABLET ORAL EVERY 6 HOURS SCHEDULED
Status: DISCONTINUED | OUTPATIENT
Start: 2024-09-12 | End: 2024-09-12 | Stop reason: HOSPADM

## 2024-09-12 RX ORDER — DIPHENHYDRAMINE HCL 12.5MG/5ML
12.5 LIQUID (ML) ORAL EVERY 6 HOURS PRN
Status: DISCONTINUED | OUTPATIENT
Start: 2024-09-12 | End: 2024-09-12 | Stop reason: HOSPADM

## 2024-09-12 RX ORDER — ONDANSETRON HYDROCHLORIDE 2 MG/ML
4 INJECTION, SOLUTION INTRAVENOUS EVERY 8 HOURS PRN
Status: DISCONTINUED | OUTPATIENT
Start: 2024-09-12 | End: 2024-09-12 | Stop reason: HOSPADM

## 2024-09-12 RX ORDER — MORPHINE SULFATE 2 MG/ML
INJECTION, SOLUTION INTRAMUSCULAR; INTRAVENOUS AS NEEDED
Status: DISCONTINUED | OUTPATIENT
Start: 2024-09-12 | End: 2024-09-12 | Stop reason: HOSPADM

## 2024-09-12 RX ORDER — ACETAMINOPHEN 325 MG/1
975 TABLET ORAL ONCE
Status: COMPLETED | OUTPATIENT
Start: 2024-09-12 | End: 2024-09-12

## 2024-09-12 RX ORDER — FENTANYL CITRATE 50 UG/ML
50 INJECTION, SOLUTION INTRAMUSCULAR; INTRAVENOUS ONCE
Status: COMPLETED | OUTPATIENT
Start: 2024-09-12 | End: 2024-09-12

## 2024-09-12 RX ORDER — FENTANYL CITRATE 50 UG/ML
25 INJECTION, SOLUTION INTRAMUSCULAR; INTRAVENOUS EVERY 5 MIN PRN
Status: DISCONTINUED | OUTPATIENT
Start: 2024-09-12 | End: 2024-09-12 | Stop reason: HOSPADM

## 2024-09-12 RX ORDER — PROPOFOL 10 MG/ML
INJECTION, EMULSION INTRAVENOUS AS NEEDED
Status: DISCONTINUED | OUTPATIENT
Start: 2024-09-12 | End: 2024-09-12

## 2024-09-12 RX ORDER — TRANEXAMIC ACID 100 MG/ML
INJECTION, SOLUTION INTRAVENOUS AS NEEDED
Status: DISCONTINUED | OUTPATIENT
Start: 2024-09-12 | End: 2024-09-12

## 2024-09-12 RX ORDER — CEFAZOLIN SODIUM 2 G/100ML
2 INJECTION, SOLUTION INTRAVENOUS ONCE
Status: COMPLETED | OUTPATIENT
Start: 2024-09-12 | End: 2024-09-12

## 2024-09-12 RX ADMIN — MORPHINE SULFATE 2 MG: 10 INJECTION INTRAVENOUS at 13:32

## 2024-09-12 RX ADMIN — PROPOFOL 25 MG: 10 INJECTION, EMULSION INTRAVENOUS at 13:13

## 2024-09-12 RX ADMIN — PROPOFOL 100 MG: 10 INJECTION, EMULSION INTRAVENOUS at 12:41

## 2024-09-12 RX ADMIN — SODIUM CHLORIDE, POTASSIUM CHLORIDE, SODIUM LACTATE AND CALCIUM CHLORIDE: 600; 310; 30; 20 INJECTION, SOLUTION INTRAVENOUS at 12:10

## 2024-09-12 RX ADMIN — MORPHINE SULFATE 2 MG: 10 INJECTION INTRAVENOUS at 13:28

## 2024-09-12 RX ADMIN — TRANEXAMIC ACID 1000 MG: 100 INJECTION, SOLUTION INTRAVENOUS at 14:10

## 2024-09-12 RX ADMIN — PROPOFOL 65 MG: 10 INJECTION, EMULSION INTRAVENOUS at 13:22

## 2024-09-12 RX ADMIN — PROPOFOL 50 MG: 10 INJECTION, EMULSION INTRAVENOUS at 14:04

## 2024-09-12 RX ADMIN — CEFAZOLIN SODIUM 2 G: 2 INJECTION, SOLUTION INTRAVENOUS at 12:28

## 2024-09-12 RX ADMIN — ONDANSETRON HYDROCHLORIDE 4 MG: 2 INJECTION INTRAMUSCULAR; INTRAVENOUS at 14:12

## 2024-09-12 RX ADMIN — TRANEXAMIC ACID 1000 MG: 100 INJECTION, SOLUTION INTRAVENOUS at 12:51

## 2024-09-12 RX ADMIN — MORPHINE SULFATE 2 MG: 10 INJECTION INTRAVENOUS at 14:03

## 2024-09-12 RX ADMIN — PROPOFOL 50 MG: 10 INJECTION, EMULSION INTRAVENOUS at 14:02

## 2024-09-12 RX ADMIN — ROPIVACAINE HYDROCHLORIDE 20 ML: 5 INJECTION, SOLUTION EPIDURAL; INFILTRATION; PERINEURAL at 12:13

## 2024-09-12 RX ADMIN — DEXAMETHASONE SODIUM PHOSPHATE 8 MG: 4 INJECTION, SOLUTION INTRAMUSCULAR; INTRAVENOUS at 13:20

## 2024-09-12 RX ADMIN — ROPIVACAINE HYDROCHLORIDE 10 ML: 5 INJECTION, SOLUTION EPIDURAL; INFILTRATION; PERINEURAL at 12:14

## 2024-09-12 RX ADMIN — PROPOFOL 110 MG: 10 INJECTION, EMULSION INTRAVENOUS at 13:12

## 2024-09-12 SDOH — HEALTH STABILITY: MENTAL HEALTH: CURRENT SMOKER: 0

## 2024-09-12 ASSESSMENT — ACTIVITIES OF DAILY LIVING (ADL): ADL_ASSISTANCE: INDEPENDENT

## 2024-09-12 ASSESSMENT — COGNITIVE AND FUNCTIONAL STATUS - GENERAL
STANDING UP FROM CHAIR USING ARMS: A LITTLE
MOVING FROM LYING ON BACK TO SITTING ON SIDE OF FLAT BED WITH BEDRAILS: A LITTLE
MOBILITY SCORE: 18
WALKING IN HOSPITAL ROOM: A LITTLE
TURNING FROM BACK TO SIDE WHILE IN FLAT BAD: A LITTLE
MOVING TO AND FROM BED TO CHAIR: A LITTLE
CLIMB 3 TO 5 STEPS WITH RAILING: A LITTLE

## 2024-09-12 ASSESSMENT — COLUMBIA-SUICIDE SEVERITY RATING SCALE - C-SSRS
2. HAVE YOU ACTUALLY HAD ANY THOUGHTS OF KILLING YOURSELF?: NO
6. HAVE YOU EVER DONE ANYTHING, STARTED TO DO ANYTHING, OR PREPARED TO DO ANYTHING TO END YOUR LIFE?: NO
1. IN THE PAST MONTH, HAVE YOU WISHED YOU WERE DEAD OR WISHED YOU COULD GO TO SLEEP AND NOT WAKE UP?: NO

## 2024-09-12 ASSESSMENT — PAIN DESCRIPTION - DESCRIPTORS
DESCRIPTORS: SORE
DESCRIPTORS: SORE
DESCRIPTORS: ACHING

## 2024-09-12 ASSESSMENT — PAIN SCALES - GENERAL
PAINLEVEL_OUTOF10: 0 - NO PAIN
PAINLEVEL_OUTOF10: 2
PAINLEVEL_OUTOF10: 0 - NO PAIN
PAINLEVEL_OUTOF10: 6
PAINLEVEL_OUTOF10: 2

## 2024-09-12 ASSESSMENT — PAIN - FUNCTIONAL ASSESSMENT
PAIN_FUNCTIONAL_ASSESSMENT: 0-10

## 2024-09-12 NOTE — POST-PROCEDURE NOTE
"1655-Patient came over from PACU.  She is alert and awake.  Dressing to RLE is C/D/I, no drainage.  She denies any pain.  Tolerated snack without N&V.  She is working with therapy now.  Spouse brought back to room.      1708-Patient up in chair.  She was walking in halls with therapy.  Therapy says she is good to go home.  Left a message for RX to go.      1730-Patient assisted OOB to bathroom.  She was able to void \"a little bit\"per patient. She wants to go home.     1745-Discharge instructions reviewed with patient alone, spouse left the unit, she verbalized understanding.   I helped her get dressed.  Another nurse went to look for spouse and found her daughter who came back to room to help with belongings aand discharge.    "

## 2024-09-12 NOTE — OP NOTE
Knee Replacement Total Cement Unilat (R) Operative Note     Date: 2024  OR Location: [facility]    Name: Domenica Estrada, : 1945, Age: 78 y.o., MRN: [id number], Sex: female    Diagnosis  Pre-op Diagnosis      * Chronic pain of right knee [M25.561, G89.29] Post-op Diagnosis     * Chronic pain of right knee [M25.561, G89.29]     Procedures  Knee Replacement Total Cement Unilat  39290 - [facility] ARTHRP KNE CONDYLE&PLATU MEDIAL&LAT COMPARTMENTS      Surgeons      * Sammy Wise - Primary    Resident/Fellow/Other Assistant:  Surgeons and Role:  * No surgeons found with a matching role *    Procedure Summary  Anesthesia: Regional, Spinal  ASA: II  Anesthesia Staff: Anesthesiologist (Solo in Case): Pearl Morales MD MPH  Estimated Blood Loss: 50mL  Intra-op Medications:   Administrations occurring from 1200 to 1400 on 24:   Medication Name Total Dose   ropivacaine (Naropin) 5 mg/mL (0.5 %) injection 30 mL   EPINEPHrine (Adrenalin) injection 0.2 mg   sodium chloride (PF) 0.9% solution 20 mL   ketorolac (Toradol) injection 30 mg   morphine injection 2 mg   lactated Ringer's infusion 1,163.33 mL   ceFAZolin (Ancef) 2 g in dextrose (iso)  mL 2 g   fentaNYL PF (Sublimaze) injection 50 mcg 50 mcg   midazolam (Versed) injection 2 mg 1 mg              Anesthesia Record               Intraprocedure I/O Totals          Intake    Tranexamic Acid 0.00 mL    The total shown is the total volume documented since Anesthesia Start was filed.    Total Intake 0 mL          Specimen:   ID Type Source Tests Collected by Time   1 : right total knee arthroplasty Tissue KNEE ARTHROPLASTY RIGHT SURGICAL PATHOLOGY EXAM Sammy Wise MD 2024 1322        Staff:   Circulator: Pako Yi Person: Lei iY Person: May         Drains and/or Catheters: * None in log *    Tourniquet Times:   * Missing tourniquet times found for documented tourniquets in lo *     Implants:  Implants       Type Name Action  Serial No.      Implant CEMENT, BONE, SIMPLEX P, RADIOPAQUE, FULL DOSE, 40 GM - [id number] Implanted      Implant CEMENT, BONE, SIMPLEX P, RADIOPAQUE, FULL DOSE, 40 GM - [id number] Implanted      Joint Knee INSERT, ATTUNE PS RP, SZ 4, 5MM - [id number] Implanted      Joint Knee TIBAL BASE, ATTUNE, ROTATING PLATFORM, VALERIA, SZ 4 - [id number] Implanted      Joint Knee FEMORAL, ATTUNE PS, VALERIA, SZ 4, RT - [id number] Implanted      Joint Knee DOME, PATELLA, MEDIALIZED, 35MM - [id number] Implanted               Findings: severe djd    Indications: Domenica Estrada is an 78 y.o. female who is having surgery for Chronic pain of right knee [M25.561, G89.29].     The patient was seen in the preoperative area. The risks, benefits, complications, treatment options, non-operative alternatives, expected recovery and outcomes were discussed with the patient. The possibilities of reaction to medication, pulmonary aspiration, injury to surrounding structures, bleeding, recurrent infection, the need for additional procedures, failure to diagnose a condition, and creating a complication requiring transfusion or operation were discussed with the patient. The patient concurred with the proposed plan, giving informed consent.  The site of surgery was properly noted/marked if necessary per policy. The patient has been actively warmed in preoperative area. Preoperative antibiotics have been ordered and given within 1 hours of incision. Venous thrombosis prophylaxis have been ordered including bilateral sequential compression devices and chemical prophylaxis    Procedure Details: Statement of medical necessity:    This is a 78 y.o. female with severe degenerative disease of the knee that has failed non operative management. the risks, benefits and alternatives of total knee arthroplasty were discussed with the patient and they signed informed consent.           Description of procedure:   The patient was brought to the operating room and placed  on the operating table in the supine position. All bony prominences were well padded. Appropriate preoperative antibiotics were administered. Anesthesia was induced by the anesthesia team and a time out was performed. The patient was identified by name and medical record number and the laterality and the site of surgery were confirmed by all present.     Under pneumatic tourniquet control, a longitudinal anterior skin incision was made with medial parapatellar arthrotomy. Hemostasis was obtained with Bovie electrocautery. Comprehensive exposure the knee was carried out for total knee arthroplasty. The patella was subluxed laterally and the knee placed in a flexed position.      Using intramedullary alignment, the distal femur was cut in a 5° valgus position. The appropriate-sized femoral component was selected based upon intraoperative measurements of the sagittal dimension of the femur. The distal femoral cutting guide was placed perpendicular to Whitesides line and parallel with the transepicondylar axis, with  3° of external rotation based upon the posterior condylar axis. The distal femur was then finished to accept a posterior stabilized prosthesis.     Attention was then directed to the proximal tibia. Meniscal remnants were excised. Using extramedullary alignment, the proximal tibia was cut perpendicular to its longitudinal axis with a 3° posterior slope. Osteophytes were excised from the posterior femur, medial and lateral femur, and circumferentially about the tibia to avoid soft tissue impingement. The posterior capsule, medial lateral soft tissue structures were injected using combination of Duramorph, Ropivicaine, toradol and epinephrine.Flexion and extension gaps were assessed.     Flexion and extension gaps were equal and rectangular. No ligamentous release was necessary for appropriate balance.     Trial components were placed. Knee achieved full extension and flexion with excellent varus and valgus  stability throughout range of motion. The patella tracked centrally. Tibial component rotation was marked, and the tibia finished in the usual fashion to accept an appropriately sized tibial component.     bone surfaces prepared with pulsatile saline irrigation. Final implants were inserted using Simplex cement. Cement was pressurized and excess cement was cleared. Cement was further pressurized with a trial articular surface in place while the knee was in full extension.     The patella was assessed and its surfaces judged appropriate for prosthetic resurfacing patellar arthroplasty. Patellar was measured and cut using an oscillating saw.  It was prepared for prosthetic resurfacing in the standard fashion. Additionally,  excision of synovium from the periphery of the patella and circumferential electrocautery and excision of ostephytes was also performed. with the trial component in place there was excellent patellar tracking and mechanics.      After hardening of cement, mechanics of the knee were again assessed. The final tibial articular surface trial was accepted for final implant sizing.     After irrigation, the definitive tibial articular surface was inserted into the tibial tray and locking mechanism engaged.     Joint was irrigated with dilute betadaine solution, followed by normal saline.   The arthrotomy was reapproximated using #1 poly and #2 quill sutures. Subcutaneous layer was closed with 2-0 poly, subcuticular layer with 3-0 v-lock, then perineo mesh and glue dressing was placed followed by mepilex, cast padding, and ace wrap.     Patient was awoken from anesthesia and brought to the pacu in stable position.     Post operative plan: patient may bear weight as tolerated, anterior hip precautions, antibiotics and dvt prophylaxis per protocol, standard post operative supportive care    Statement of staff presence: I was present and scrubbed for all critical portions of the procedure and was immediately  available during closing      Complications:  None; patient tolerated the procedure well.    Disposition: PACU - hemodynamically stable.  Condition: stable         Additional Details: none    Attending Attestation: I was present and scrubbed for the key portions of the procedure.    Sammy Wise  Phone Number: [phone]

## 2024-09-12 NOTE — ANESTHESIA PREPROCEDURE EVALUATION
Patient: Domenica Estrada    Procedure Information       Date/Time: 09/12/24 1200    Procedure: Knee Replacement Total Cement Unilat (Right: Knee) - depuy attune    Location: [facility] / [facility]    Surgeons: Sammy Wise MD            Relevant Problems   Anesthesia (within normal limits)      Cardiac   (+) Hyperlipidemia      Pulmonary (within normal limits)      Neuro   (+) Anxiety      GI   (+) Gastroesophageal reflux disease (improved)      /Renal (within normal limits)      Liver (within normal limits)      Endocrine (within normal limits)      Hematology (within normal limits)      Musculoskeletal   (+) Osteoarthritis      HEENT (within normal limits)      ID (within normal limits)      Skin (within normal limits)      GYN (within normal limits)     Past Surgical History:   Procedure Laterality Date   • APPENDECTOMY     • CHOLECYSTECTOMY     • COLONOSCOPY     • KNEE ARTHROPLASTY Left 05/09/2024       Clinical information reviewed:                   NPO Detail:  No data recorded     Lab Results   Component Value Date    WBC 6.8 08/29/2024    HGB 14.2 08/29/2024    HCT 44.4 08/29/2024    MCV 86 08/29/2024     08/29/2024       Chemistry    Lab Results   Component Value Date/Time     08/29/2024 1230    K 4.7 08/29/2024 1230     08/29/2024 1230    CO2 26 08/29/2024 1230    BUN 24 08/29/2024 1230    CREATININE 1.00 08/29/2024 1230    Lab Results   Component Value Date/Time    CALCIUM 9.3 08/29/2024 1230          Physical Exam    Airway  Mallampati: III  TM distance: >3 FB  Neck ROM: full     Cardiovascular - normal exam     Dental    Pulmonary - normal exam     Abdominal - normal exam       Other findings: Small mouth opening      Anesthesia Plan    History of general anesthesia?: yes  History of complications of general anesthesia?: no    ASA 2     regional and spinal   (ACB + IPACK)  The patient is not a current smoker.  Patient was not previously instructed to abstain from smoking  on day of procedure.  Patient did not smoke on day of procedure.  Education provided regarding risk of obstructive sleep apnea.  intravenous induction   Postoperative administration of opioids is intended.  Anesthetic plan and risks discussed with patient.  Use of blood products discussed with patient who consented to blood products.

## 2024-09-12 NOTE — ANESTHESIA PROCEDURE NOTES
Peripheral Block    Patient location during procedure: pre-op  Start time: 9/12/2024 12:14 PM  End time: 9/12/2024 12:15 PM  Reason for block: at surgeon's request and post-op pain management  Staffing  Performed: attending   Authorized by: Pearl Morales MD MPH    Performed by: Pearl Morales MD MPH  Preanesthetic Checklist  Completed: patient identified, IV checked, site marked, risks and benefits discussed, surgical consent, monitors and equipment checked, pre-op evaluation and timeout performed   Timeout performed at: 9/12/2024 12:10 PM  Peripheral Block  Patient position: sitting  Prep: ChloraPrep  Patient monitoring: heart rate  Block type: other (I PACK)  Laterality: right  Injection technique: single-shot  Guidance: ultrasound guided  Local infiltration: ropivacaine  Infiltration strength: 0.5 %  Dose: 10 mL  Needle  Needle gauge: 21 G  Needle length: 10 cm  Needle localization: ultrasound guidance     image stored in chart  Assessment  Injection assessment: negative aspiration for heme, no paresthesia on injection, incremental injection and local visualized surrounding nerve on ultrasound  Slow fractionated injection: yes

## 2024-09-12 NOTE — PROGRESS NOTES
Physical Therapy    Physical Therapy Evaluation & Treatment    Patient Name: Domenica Estrada  MRN: [id number]  Department:   Room: [facility] OR  [facility] Date: 9/12/2024   Time Calculation  Start Time: 1622  Stop Time: 1702  Time Calculation (min): 40 min    Assessment/Plan   PT Assessment  PT Assessment Results: Decreased strength, Decreased range of motion, Decreased endurance, Impaired balance, Decreased mobility, Decreased coordination, Decreased cognition, Decreased safety awareness, Impaired sensation, Decreased skin integrity, Orthopedic restrictions, Pain  Rehab Prognosis: Good  Barriers to Discharge: none  Evaluation/Treatment Tolerance: Patient tolerated treatment well  Medical Staff Made Aware: Yes  Strengths: Premorbid level of function  Barriers to Participation: Comorbidities  End of Session Communication: Bedside nurse  Assessment Comment: Pt familiar w/ procedure after Lt TKA 5/24. Pt  w/ fairly good Rt knee function post op, however, noted to have slight Rt ankle footdrop, required cues for technique and safety awareness w/ amb. Pt would benefit from continued PT serivces post op tp progress safe funcitonal mobility and optimize outcome.  End of Session Patient Position: Up in chair, Alarm off, not on at start of session   IP OR SWING BED PT PLAN  Inpatient or Swing Bed: Inpatient  PT Plan  Treatment/Interventions:  (NA; pt to follow up w/ HH PT after discharge)  PT Plan: PT Eval only  PT Eval Only Reason: Safe to return home (w/ assist)  PT Frequency: PT eval only  PT Discharge Recommendations: Low intensity level of continued care, No further acute PT  Equipment Recommended upon Discharge: Wheeled walker  PT Recommended Transfer Status: Assist x1, Assistive device  PT - OK to Discharge: Yes      Subjective     General Visit Information:  General  Reason for Referral: recent surgery, s/p Rt TKA  Referred By: Dr Wise  Past Medical History Relevant to Rehab: anxiety, OA, Lt TKA 5/24,  tremors  Family/Caregiver Present: Yes  Caregiver Feedback: spouse (supportive but quiet, limited engagement, [place])  Prior to Session Communication: Bedside nurse  Patient Position Received: On cart, Bed, 2 rail up  Preferred Learning Style: verbal, visual, written  General Comment: Pt is an 78 y.o. female adm for elective Rt TKA on 9/12/24 w/ Dr Wise  Home Living:  Home Living  Lives With: Spouse (dtr  will stay w/ pt upon discharge)  Home Adaptive Equipment: Walker rolling or standard, Cane, Reacher, Sock aid, Long-handled shoehorn  Home Layout: Multi-level, Able to live on main level with bedroom/bathroom  Home Access: Ramped entrance  Bathroom Shower/Tub: Walk-in shower  Bathroom Toilet: Standard  Bathroom Equipment: Grab bars in shower, Built-in shower seat  Prior Level of Function:  Prior Function Per Pt/Caregiver Report  Level of [place]: Independent with ADLs and functional transfers, Independent with homemaking with ambulation  Receives Help From: Family  ADL Assistance: Independent  Homemaking Assistance: Independent  Ambulatory Assistance: Independent  Prior Function Comments: Pt reports not using AD PTA, IND, drives.  Precautions:  Precautions  Hearing/Visual Limitations: glasses  LE Weight Bearing Status: Weight Bearing as Tolerated  Medical Precautions: Fall precautions  Post-Surgical Precautions: Left total knee precautions  Precautions Comment: educated pt on post op TKA precautions and issued handout fotr reference. Educated pt on initial post op HEP w/ handouts issued for reference.      Objective   Pain:  Pain Assessment  Pain Assessment: 0-10  0-10 (Numeric) Pain Score: 0 - No pain  Pain Type: Surgical pain  Pain Location: Knee  Pain Orientation: Right  Cognition:  Cognition  Overall Cognitive Status: Within Functional Limits  Cognition Comments: pt quiet, cooperative  Attention:  (can get distracted mid task (during amb))  Processing Speed: Delayed (slightly)    General  Assessments:  General Observation  General Observation: pt w/ resting tremors at head, UEs noted (Pt reports that people have told her that. When spouse was questioned about it, he reported that it may appear a little more pronounced post op)    Activity Tolerance  Endurance: Endurance does not limit participation in activity  Activity Tolerance Comments: Fair post op    Sensation  Sensation Comment: pt reports still numb from leg to foot    Strength  Strength Comments: RLE 3-/5 hip, knee, 2/5 ankle dorsiflexion ; LLE 3/5 or >  Coordination  Coordination Comment: decreased post op, slow, decreased accuracy    Postural Control  Posture Comment: forward rounded, c-spine flexion    Dynamic Standing Balance  Dynamic Standing-Balance Support: Bilateral upper extremity supported  Dynamic Standing-Level of Assistance: Contact guard  Dynamic Standing-Balance: Turning (amb)  Dynamic Standing-Comments: Fair/Fair-  Functional Assessments:  Bed Mobility  Bed Mobility: Yes  Bed Mobility 1  Bed Mobility 1: Supine to sitting  Level of Assistance 1: Close supervision  Bed Mobility Comments 1: to Rt edge of cart. HOB elevated part way    Transfers  Transfer: Yes  Transfer 1  Technique 1: Sit to stand, Stand to sit  Transfer Device 1: Walker  Transfer Level of Assistance 1: Minimum assistance  Trials/Comments 1: from edge of cart to chair w/ arms, repeat cues for safe hand placement when using RW. Educated spouse as well w/ verbal and visual demonstration  to safely assist pt. Pt w/ LOB x 1 when turning to sit in chair, assist to steady.    Ambulation/Gait Training  Ambulation/Gait Training Performed: Yes  Ambulation/Gait Training 1  Surface 1: Level tile  Device 1: Rolling walker  Gait Support Devices: Gait belt  Assistance 1: Minimum assistance, Contact guard  Comments/Distance (ft) 1: Pt amb ~ 60' x 1, verbal cues for step to pattern, not to adavance RW while taking steps due to continued numbness RLE and presence of tremors at  head and UEs (spouse acknowledges may be a little more pronounced post op).  Cues to pay attention to task being performed as pt can get distracted. No buckling, but mild unsteadiness; encouraged pt's spouse to have contact w/ pt during mobility.    Stairs  Stairs: No (pt reports does not have to negotiate steps when she goes home; was able to explain gait sequence when doing stair negotiation.)  Extremity/Trunk Assessments:  RLE   RLE :  (knee flexion ~85 degrees flexion in sitting)  LLE   LLE : Within Functional Limits  Treatments:  Ambulation/Gait Training  Ambulation/Gait Training Performed: Yes  Ambulation/Gait Training 1  Surface 1: Level tile  Device 1: Rolling walker  Gait Support Devices: Gait belt  Assistance 1: Minimum assistance, Contact guard  Comments/Distance (ft) 1: Pt amb ~ 60' x 1, verbal cues for step to pattern, not to adavance RW while taking steps due to continued numbness RLE and presence of tremors at head and UEs (spouse acknowledges may be a little more pronounced post op).  Cues to pay attention to task being performed as pt can get distracted. No buckling, but mild unsteadiness; encouraged pt's spouse to have contact w/ pt during mobility.  Transfers  Transfer: Yes  Transfer 1  Technique 1: Sit to stand, Stand to sit  Transfer Device 1: Walker  Transfer Level of Assistance 1: Minimum assistance  Trials/Comments 1: from edge of cart to chair w/ arms, repeat cues for safe hand placement when using RW. Educated spouse as well w/ verbal and visual demonstration  to safely assist pt. Pt w/ LOB x 1 when turning to sit in chair, assist to steady.  Outcome Measures:  [facility] Basic Mobility  Turning from your back to your side while in a flat bed without using bedrails: A little  Moving from lying on your back to sitting on the side of a flat bed without using bedrails: A little  Moving to and from bed to chair (including a wheelchair): A little  Standing up from a chair using your arms (e.g.  wheelchair or bedside chair): A little  To walk in hospital room: A little  Climbing 3-5 steps with railing: A little  Basic Mobility - Total Score: 18        Education Documentation  Handouts, taught by Cathi House PT at 9/12/2024  6:55 PM.  Learner: Patient  Readiness: Acceptance  Method: Explanation  Response: Verbalizes Understanding, Needs Reinforcement    Precautions, taught by Cathi House, PT at 9/12/2024  6:55 PM.  Learner: Patient  Readiness: Acceptance  Method: Explanation  Response: Verbalizes Understanding, Needs Reinforcement    Home Exercise Program, taught by Cathi House, PT at 9/12/2024  6:55 PM.  Learner: Patient  Readiness: Acceptance  Method: Explanation  Response: Verbalizes Understanding, Needs Reinforcement    Mobility Training, taught by Cathi House, PT at 9/12/2024  6:55 PM.  Learner: Patient  Readiness: Acceptance  Method: Explanation  Response: Verbalizes Understanding, Needs Reinforcement    Education Comments  No comments found.

## 2024-09-12 NOTE — ANESTHESIA PROCEDURE NOTES
Peripheral Block    Patient location during procedure: pre-op  Start time: 9/12/2024 12:12 PM  End time: 9/12/2024 12:14 PM  Reason for block: at surgeon's request and post-op pain management  Staffing  Performed: attending   Authorized by: Pearl Morales MD MPH    Performed by: Pearl Morales MD MPH  Preanesthetic Checklist  Completed: patient identified, IV checked, site marked, risks and benefits discussed, surgical consent, monitors and equipment checked, pre-op evaluation and timeout performed   Timeout performed at: 9/12/2024 12:10 PM  Peripheral Block  Patient position: sitting  Prep: ChloraPrep  Patient monitoring: heart rate  Block type: adductor canal  Laterality: right  Injection technique: single-shot  Guidance: ultrasound guided  Local infiltration: ropivacaine  Infiltration strength: 0.5 %  Dose: 20 mL  Needle  Needle gauge: 21 G  Needle length: 10 cm  Needle localization: ultrasound guidance     image stored in chart  Assessment  Injection assessment: negative aspiration for heme, no paresthesia on injection, incremental injection and local visualized surrounding nerve on ultrasound  Slow fractionated injection: yes

## 2024-09-12 NOTE — ANESTHESIA PROCEDURE NOTES
Spinal Block    Start time: 9/12/2024 12:32 PM  End time: 9/12/2024 12:39 PM  Reason for block: primary anesthetic  Staffing  Performed: attending   Authorized by: Pearl Morales MD MPH    Performed by: Pearl Morales MD MPH    Preanesthetic Checklist  Completed: patient identified, IV checked, risks and benefits discussed, surgical consent, monitors and equipment checked, pre-op evaluation, timeout performed and sterile techniques followed  Block Timeout  RN/Licensed healthcare professional reads aloud to the Anesthesia provider and entire team: Patient identity, procedure with side and site, patient position, and as applicable the availability of implants/special equipment/special requirements.    Timeout performed at: 9/12/2024 12:32 PM  Spinal Block  Patient position: sitting  Prep: Betadine  Sterility prep: cap, gloves, hand hygiene and mask  Sedation level: light sedation  Patient monitoring: blood pressure, continuous pulse oximetry and heart rate  Approach: midline  Vertebral space: L3-4  Injection technique: single-shot  Needle  Needle type: Laura   Needle gauge: 25 G  Needle length: 3.5 in  Free flowing CSF: yes    Assessment  Block outcome: patient comfortable  Events: paresthesia  Procedure assessment: patient tolerated procedure well with no immediate complications  Additional Notes  Transient paresthesia on L, resolved prior to injecting medication  1.2 ml 0.75% Bupivacaine in Dext injected intrathecally at 12:39

## 2024-09-12 NOTE — HH CARE COORDINATION
Home Care received a Referral for Physical Therapy. We have processed the referral for a Start of Care on 09/13.     If you have any questions or concerns, please feel free to contact us at [phone]. Follow the prompts, enter your five digit zip code, and you will be directed to your care team on [facility].

## 2024-09-12 NOTE — DISCHARGE INSTRUCTIONS
TOTAL HIP AND KNEE REPLACEMENT DISCHARGE INSTRUCTIONS  Sammy Wise MD      DRIVING & TRAVEL AFTER SURGERY   Patients should anticipate waiting at least 4-6 weeks before traveling long distances after surgery.  You will need to stop to walk around ever 1 hour during your travel to help with blood clot prevention.  Please call the office or your joint nurse to discuss prior to post-surgical travel.  Patients may not drive until cleared by the joint nurse or the office.    DENTAL PROCEDURES & CLEANINGS  You must wait a minimum of 3 months for elective dental appointments, including routine cleanings or dental work including bridges, crowns, extractions, etc..  For any dental visit - cleaning or dental procedures - patients must take an antibiotic 1 hour before the appointment.  Antibiotics are a lifelong need before dental appointments.  The antibiotic prescribed will be based on each patient's allergies.    WOUND CARE  If you experience continued drainage or bleeding, you may cover with abdominal pads (purchase at local drug store).  Knee replacements should wrap with an ace wrap.  Do not remove surgical dressing, it will be removed when you arrive in clinic for follow up visit.   Mesh dressing under the bandage will remain in place until it peels off on its own.  If it is loose, you may gently remove.  Do not remove if pulling causes resistance against the incision.      PAIN, SWELLING, BRUISING & CLICKING  Pain and swelling are a natural part of your recovery which is considered normal fur up to a year after surgery.  Symptoms may be treated with movement, ice, compression stockings, elevating your leg, and by following the pain medication regimen as prescribed.  Bruising is normal for several weeks after surgery and will run down the leg over time.  You may ice areas that are tender to help with discomfort.  You are required to wear the provided compression stockings, every day, for 4 weeks following surgery.   Remove the stockings at night and place them back on in the morning.  Pain and swelling may temporarily increase with an increase in activity or exercise.  Use ice after activity.  Audible clicking with movement or exercises is considered normal following joint replacement.  You may also feel decreased sensation or numbness near the incision site.  These are usually normal and may or may not fade over time.     PERSONAL HYGIENE  You may shower upon discharging from the hospital.  Soap and water is permitted to run over the surgical dressing, steri-strips and incision.  Do not scrub directly over these items.  DO NOT soak your incision in a bath, hot tub, pool or pond/lake for a minimum of 8 weeks following your surgery.  DO NOT use lotions, creams, ointments on your wound for a minimum of 6 weeks following your surgery. At that time you may use vitamin E to assist with softening of your incision.      RESTARTING HOME ROUTINE - DIET & MEDICATIONS  Post-operative constipation can result due to a combination of inactivity, anesthesia and pain medication. To help prevent this, you should increase your water and fiber intake. Physical activity such as walking will also help stimulate the bowels.   You may resume your normal diet when you discharge home.  Choose foods that help promote good bowel habits and prevent constipation, such as foods high in fiber.  You may restart your home medications the following day after your surgery UNLESS you have been given alternate instructions.  Follow the instructions given to you on your hospital discharge instructions for more information regarding your home medications.      IN-HOME PHYSICAL THERAPY & OUTPATIENT PHYSICAL THERAPY  In-home physical therapy will start 1-2 days after you get home from the hospital.    The home care agency will call within the first 24-48 hours to set up their first visit.  Please do not call your care team to inquire during this timeframe.  Continue  the exercises you were given in the hospital until you have been seen by in-home therapy.  Make sure to provide a phone number with the ability for the home care staff to leave a message if you do not answer your phone.    Outpatient physical therapy following knee replacement surgery should begin 2-3 weeks after surgery.  You should call to schedule this appointment ASAP if not already scheduled before surgery.  Waiting until you are ready for outpatient physical therapy will cause a delay in your care.  You may choose any outpatient physical therapy location.  Call the office for an order if needed.    EMERGENCIES - WHEN TO CONTACT THE SURGEON'S OFFICE IMMEDIATELY  Fever >101 with chills that has been present for at least 48 hours.   Excessive bleeding from incision that will not slow down. A small amount of drainage is normal and expected.  Once pressure is applied and the area is covered, do not continue to check the area regularly.  This will remove pressure and bleeding will continue.  Leave in place for 4-6 hours.  Signs of infection of incision-excessive drainage that is soaking through your dressing (especially if it is pus-like), redness that is spreading out from the edges of your incision, or increased warmth around the area.  Excruciating pain for which the pain medication, taken as instructed, is not helping.  Severe calf pain.  Go directly to the emergency room or call 911, if you are experiencing chest pain or difficulty breathing.    ICE & COLD THERAPY INSTRUCTIONS    To assist with pain control and post-op swelling, you should be using ice regularly throughout recovery, especially for the first 6 weeks, regardless of the cold therapy method you use.      Always make sure there is a layer of protection between the cold pad and your skin.    If you are using ICE PACKS or GEL PACKS, you will need to alternate 20 minutes on, 20 minutes off twice per hour.    If you are using an ICE MACHINE, please  follow the provided ice machine instructions.  These devices differ from ice or ice packs whereas the mechanism circulates water through tubing and a pad to provide longer periods of cold therapy to the desired site.  You can use your cold devices around the clock for optimal comfort.  We recommend using cold therapy after working with therapy or completing exercises on your own.  There is no set schedule in which you must follow while using cold therapy.  Below are a few points to remember when using a cold therapy device:    You do not need to need to use the 20 on, 20 off method.  Detach the pad from the cooler and ambulate at least once every hour.  You can check your skin under the pad at this time.  You may wear the cold therapy device during periods of sleep including overnight.  If you wake up during the night, you can check the skin at this time.  You do not need to wake up specifically to perform skin checks.  Empty the cooler and pad when device is not in use.  Follow 's instructions for cleaning your cold therapy device.      DISCHARGE MEDICATIONS    PAIN MEDICATION    ____ Oxycodone-acetaminophen  Oxycodone-acetaminophen has been prescribed for post-operative pain control.    These medications may only be refilled if neededONCE every 7 days for a period of up to 6 weeks following surgery.  After 6 weeks, you will transition to acetaminophen and over -the- counter anti-inflammatories such as Ibuprofen, Advil or Aleve in conjunction with ICE/COLD THERAPY.   Side effects may be constipation and nausea, vomiting, sleepiness, dizziness, lightheadedness, headache, blurred vision, dry mouth sweating, itching (if you have itching, over-the -counter Benadryl can be used as needed).  You may NOT operate a motor vehicle while taking these medications or have been cleared by your care team.    Please call the office for a refill request.  The office staff and orthopedic nurses cannot refill medications;  messages should be left directly through the office.  Please do not call multiple times or call other members of the care team for medication needs, this will cause the refill to take longer.    Per State and Institutional policy, pain medications can only be refilled every 7 days for up to six weeks following surgery.   .    _______ Naproxen has been prescribed as an adjunct anti-inflammatory to assist in pain control.    Take one 500 mg tablet twice a day for 4 weeks.  You will not receive refills on this medication.   Side effects may include nausea.  May not be prescribed if you are on a more potent blood thinner than aspirin or have chronic kidney disease.    BLOOD THINNER    ____ Blood Thinner   A blood thinner has been prescribed to prevent blood clots in your leg or lungs. Take as prescribed on the bottle for 4 weeks. You will not receive a refill on this medication.      STOOL SOFTENERS    ____ Colace (Docusate Sodium)   Take this medication to help with constipation while using the Oxycodone for pain control.  You will not receive a refill on this medication.  If you have not had a bowel movement for 2 days after surgery if you feel painful constipation, you may try over-the-counter Miralax or a suppository. If you still are not able to have a bowel movement, call our office or discuss with your PCP. If you have preexisting constipation or other GI issues that may predispose you to constipation, please consult with your GI provider or PCP if you have not had a bowel movement for 2 days after surgery.     Antibiotics    ____ Cefadroxil or doxycycline  May be prescribed if needed for prophylaxis against infection   Take 7 day course of antibiotics until they are all gone  May not be prescribed if you do not meet criteria for elevated infection risk after surgery          You will not receive refills on the following medications:    Naproxen  Colace  Blood Thinner

## 2024-09-12 NOTE — ANESTHESIA PROCEDURE NOTES
Airway  Date/Time: 9/12/2024 1:13 PM  Airway not difficult    Staffing  Performed: attending   Authorized by: Pearl Morales MD MPH    Performed by: Pearl Morales MD MPH    Indications and Patient Condition  Indications for airway management: anesthesia  Spontaneous Ventilation: absent  Sedation level: deep  Preoxygenated: yes  Patient position: sniffing  Mask difficulty assessment: 0 - not attempted  Planned trial extubation    Final Airway Details  Final airway type: supraglottic airway      Successful airway: classic  Size 3     Number of attempts at approach: 2  Ventilation between attempts: none

## 2024-09-13 ENCOUNTER — HOME CARE VISIT (OUTPATIENT)
Dept: HOME HEALTH SERVICES | Facility: HOME HEALTH | Age: 79
End: 2024-09-13
Payer: MEDICARE

## 2024-09-13 VITALS
HEART RATE: 70 BPM | OXYGEN SATURATION: 99 % | DIASTOLIC BLOOD PRESSURE: 57 MMHG | TEMPERATURE: 98.1 F | SYSTOLIC BLOOD PRESSURE: 114 MMHG

## 2024-09-13 PROCEDURE — G0151 HHCP-SERV OF PT,EA 15 MIN: HCPCS | Mod: HHH

## 2024-09-13 PROCEDURE — 169592 NO-PAY CLAIM PROCEDURE

## 2024-09-13 ASSESSMENT — ACTIVITIES OF DAILY LIVING (ADL)
OASIS_M1830: 03
ENTERING_EXITING_HOME: NEEDS ASSISTANCE

## 2024-09-13 ASSESSMENT — PAIN SCALES - GENERAL: PAIN_LEVEL: 0

## 2024-09-13 ASSESSMENT — ENCOUNTER SYMPTOMS
PAIN LOCATION: RIGHT KNEE
HIGHEST PAIN SEVERITY IN PAST 24 HOURS: 6/10
PAIN: 1
PERSON REPORTING PAIN: PATIENT
PAIN LOCATION - PAIN SEVERITY: 0/10

## 2024-09-13 NOTE — ANESTHESIA POSTPROCEDURE EVALUATION
Patient: Domenica Estrada    Procedure Summary       Date: 09/12/24 Room / Location: [facility] 04 / [facility]    Anesthesia Start: 1228 Anesthesia Stop: 1452    Procedure: Knee Replacement Total Cement Unilat (Right: Knee) Diagnosis:       Chronic pain of right knee      (Chronic pain of right knee [M25.561, G89.29])    Surgeons: Sammy Wise MD Responsible Provider: David Andujar MD    Anesthesia Type: regional, spinal ASA Status: 2            Anesthesia Type: regional, spinal    Vitals Value Taken Time   /79 09/12/24 1620   Temp 36.1 °C (97 °F) 09/12/24 1605   Pulse 80 09/12/24 1620   Resp 13 09/12/24 1620   SpO2 98 % 09/12/24 1620   Vitals shown include unfiled device data.    Anesthesia Post Evaluation    Patient location during evaluation: PACU  Patient participation: complete - patient participated  Level of consciousness: awake and alert  Pain score: 0  Pain management: adequate  Multimodal analgesia pain management approach  Airway patency: patent  Two or more strategies used to mitigate risk of obstructive sleep apnea  Cardiovascular status: acceptable  Respiratory status: acceptable  Hydration status: acceptable  Postoperative Nausea and Vomiting: none    No notable events documented.

## 2024-09-18 ENCOUNTER — HOME CARE VISIT (OUTPATIENT)
Dept: HOME HEALTH SERVICES | Facility: HOME HEALTH | Age: 79
End: 2024-09-18
Payer: MEDICARE

## 2024-09-18 VITALS
OXYGEN SATURATION: 98 % | SYSTOLIC BLOOD PRESSURE: 136 MMHG | TEMPERATURE: 99.2 F | DIASTOLIC BLOOD PRESSURE: 80 MMHG | HEART RATE: 67 BPM

## 2024-09-18 PROCEDURE — G0157 HHC PT ASSISTANT EA 15: HCPCS | Mod: CQ,HHH

## 2024-09-18 ASSESSMENT — ENCOUNTER SYMPTOMS
LOWEST PAIN SEVERITY IN PAST 24 HOURS: 3/10
PAIN LOCATION: RIGHT KNEE
PAIN LOCATION - PAIN QUALITY: THROBBING
PAIN LOCATION - PAIN FREQUENCY: CONSTANT
PAIN LOCATION - EXACERBATING FACTORS: ACTIVITY
HIGHEST PAIN SEVERITY IN PAST 24 HOURS: 5/10
PAIN LOCATION - RELIEVING FACTORS: MEDICATION, ICE
PERSON REPORTING PAIN: PATIENT
PAIN: 1
PAIN LOCATION - PAIN SEVERITY: 3/10

## 2024-09-20 ENCOUNTER — HOME CARE VISIT (OUTPATIENT)
Dept: HOME HEALTH SERVICES | Facility: HOME HEALTH | Age: 79
End: 2024-09-20
Payer: MEDICARE

## 2024-09-20 VITALS
TEMPERATURE: 98.2 F | SYSTOLIC BLOOD PRESSURE: 134 MMHG | OXYGEN SATURATION: 99 % | HEART RATE: 83 BPM | DIASTOLIC BLOOD PRESSURE: 82 MMHG

## 2024-09-20 PROCEDURE — G0157 HHC PT ASSISTANT EA 15: HCPCS | Mod: CQ,HHH

## 2024-09-20 ASSESSMENT — ENCOUNTER SYMPTOMS
PAIN: 1
PAIN LOCATION - PAIN QUALITY: ACHING
PAIN LOCATION - PAIN SEVERITY: 3/10
LOWEST PAIN SEVERITY IN PAST 24 HOURS: 0/10
PERSON REPORTING PAIN: PATIENT
PAIN LOCATION - RELIEVING FACTORS: ICE, ELEVATION
PAIN LOCATION - PAIN FREQUENCY: INTERMITTENT
PAIN LOCATION: RIGHT KNEE
HIGHEST PAIN SEVERITY IN PAST 24 HOURS: 8/10
PAIN LOCATION - EXACERBATING FACTORS: MORNING TIME

## 2024-09-23 ENCOUNTER — HOME CARE VISIT (OUTPATIENT)
Dept: HOME HEALTH SERVICES | Facility: HOME HEALTH | Age: 79
End: 2024-09-23
Payer: MEDICARE

## 2024-09-23 PROCEDURE — G0151 HHCP-SERV OF PT,EA 15 MIN: HCPCS | Mod: HHH

## 2024-09-23 ASSESSMENT — ENCOUNTER SYMPTOMS
PAIN: 1
PERSON REPORTING PAIN: PATIENT

## 2024-09-25 ENCOUNTER — HOSPITAL ENCOUNTER (OUTPATIENT)
Dept: RADIOLOGY | Facility: CLINIC | Age: 79
Discharge: HOME | End: 2024-09-25
Payer: MEDICARE

## 2024-09-25 ENCOUNTER — APPOINTMENT (OUTPATIENT)
Dept: ORTHOPEDIC SURGERY | Facility: CLINIC | Age: 79
End: 2024-09-25
Payer: MEDICARE

## 2024-09-25 DIAGNOSIS — M17.11 ARTHRITIS OF RIGHT KNEE: ICD-10-CM

## 2024-09-25 DIAGNOSIS — Z96.651 S/P TOTAL KNEE ARTHROPLASTY, RIGHT: Primary | ICD-10-CM

## 2024-09-25 PROCEDURE — 73564 X-RAY EXAM KNEE 4 OR MORE: CPT | Mod: RT

## 2024-09-25 PROCEDURE — 1036F TOBACCO NON-USER: CPT

## 2024-09-25 PROCEDURE — 1160F RVW MEDS BY RX/DR IN RCRD: CPT

## 2024-09-25 PROCEDURE — 73564 X-RAY EXAM KNEE 4 OR MORE: CPT | Mod: RIGHT SIDE | Performed by: RADIOLOGY

## 2024-09-25 PROCEDURE — 99024 POSTOP FOLLOW-UP VISIT: CPT

## 2024-09-25 PROCEDURE — 1159F MED LIST DOCD IN RCRD: CPT

## 2024-09-25 ASSESSMENT — PAIN - FUNCTIONAL ASSESSMENT: PAIN_FUNCTIONAL_ASSESSMENT: NO/DENIES PAIN

## 2024-09-25 NOTE — PROGRESS NOTES
Chief Complaint   Patient presents with    Right Knee - Follow-up, Post-op         This is a 78 y.o. female who is 2 weeks out from right total knee.  Pain is under control with current medications.  No drainage from her incision no fevers or chills.  Progressing well with physical therapy and appropriately improving in function.  No other new issues or symptoms.    Right Knee examination: Surgical incision well approximated no erythema no drainage.  Stable to varus valgus stress range of motion 5-120 degrees extension flexion.  Neurovascular tact distally    X-rays of the knee were reviewed independently interpreted by me today, the show stable total knee arthroplasty no fracture dislocation or loosening    Impression plan: 78 y.o. female 2 weeks out from right total knee.  We discussed continuing physical therapy and home exercise program. Pain medications to be used as needed. Assistive devices as needed per PT. We discussed DVT prophylaxis until 6 weeks. No dentist until 3 months and thereafter dental prophylaxis for life. Activity as tolerated weightbearing as tolerated. I have personally reviewed the OARRS report for the patient. This report is scanned into the electronic medical record. I have considered the risks of abuse, dependence, addiction and diversion. Follow up 6 weeks with xrays.

## 2024-09-27 ENCOUNTER — HOME CARE VISIT (OUTPATIENT)
Dept: HOME HEALTH SERVICES | Facility: HOME HEALTH | Age: 79
End: 2024-09-27
Payer: MEDICARE

## 2024-09-30 ASSESSMENT — ACTIVITIES OF DAILY LIVING (ADL)
HOME_HEALTH_OASIS: 00
OASIS_M1830: 01

## 2024-10-01 ENCOUNTER — EVALUATION (OUTPATIENT)
Dept: PHYSICAL THERAPY | Facility: HOSPITAL | Age: 79
End: 2024-10-01
Payer: MEDICARE

## 2024-10-01 DIAGNOSIS — Z96.651 S/P TOTAL KNEE ARTHROPLASTY, RIGHT: ICD-10-CM

## 2024-10-01 DIAGNOSIS — M25.561 ACUTE PAIN OF RIGHT KNEE: Primary | ICD-10-CM

## 2024-10-01 PROCEDURE — 97161 PT EVAL LOW COMPLEX 20 MIN: CPT | Mod: GP | Performed by: PHYSICAL THERAPIST

## 2024-10-01 PROCEDURE — 97110 THERAPEUTIC EXERCISES: CPT | Mod: GP | Performed by: PHYSICAL THERAPIST

## 2024-10-01 SDOH — ECONOMIC STABILITY: FOOD INSECURITY: WITHIN THE PAST 12 MONTHS, THE FOOD YOU BOUGHT JUST DIDN'T LAST AND YOU DIDN'T HAVE MONEY TO GET MORE.: NEVER TRUE

## 2024-10-01 SDOH — ECONOMIC STABILITY: FOOD INSECURITY: WITHIN THE PAST 12 MONTHS, YOU WORRIED THAT YOUR FOOD WOULD RUN OUT BEFORE YOU GOT MONEY TO BUY MORE.: NEVER TRUE

## 2024-10-01 ASSESSMENT — ENCOUNTER SYMPTOMS
OCCASIONAL FEELINGS OF UNSTEADINESS: 0
DEPRESSION: 0
LOSS OF SENSATION IN FEET: 0

## 2024-10-01 ASSESSMENT — PAIN SCALES - GENERAL: PAINLEVEL_OUTOF10: 0 - NO PAIN

## 2024-10-01 ASSESSMENT — PAIN - FUNCTIONAL ASSESSMENT: PAIN_FUNCTIONAL_ASSESSMENT: 0-10

## 2024-10-01 NOTE — PROGRESS NOTES
Physical Therapy  Physical Therapy Orthopedic Evaluation    Patient Name: Domenica Estrada  MRN: [id number]  Encounter Date: 10/1/2024  Time Calculation  Start Time: 1426  Stop Time: 1508  Time Calculation (min): 42 min    Today's Charges  PT Evaluation Time Entry  PT Evaluation (Low) Time Entry: 20  PT Therapeutic Procedures Time Entry  Therapeutic Exercise Time Entry: 22      Insurance:  Visit number: 1 of [place] (10 visits used this year)  Authorization info: No auth required  Payor: MEDICARE / Plan: MEDICARE PART A AND B / Product Type: *No Product type* /     Current Problem  Problem List Items Addressed This Visit             ICD-10-CM    Acute pain of right knee - Primary M25.561    Relevant Orders    Follow Up In Physical Therapy    S/P total knee arthroplasty, right Z96.651     1. Acute pain of right knee  Follow Up In Physical Therapy      2. S/P total knee arthroplasty, right  Referral to Physical Therapy          General:  General  Reason for Referral: R knee pain  Referred By: Dr. Wise  Past Medical History Relevant to Rehab: L TKA 5/09/2024  Preferred Learning Style: verbal, visual, written      Precautions:   Precautions  STEADI Fall Risk Score (The score of 4 or more indicates an increased risk of falling): 1  LE Weight Bearing Status: Weight Bearing as Tolerated  Medical Precautions: No known precautions/limitation  Post-Surgical Precautions: Left total knee precautions  Precautions Comment: R TKA protocol- 09/12/2024 DOS    Medical History Form: Reviewed (scanned into chart)    Subjective:   Subjective   Chief Complaint: Patient presents to clinic s/p R TKA due to OA. The patient attended PT earlier this year for a L TKA May 2024.  Onset Date: 9/12/2024  BIBI: Insidious    Current Condition:   Better    Pain:  Pain Assessment: 0-10  0-10 (Numeric) Pain Score: 0 - No pain  Highest: 4/10 pain  Lowest: 0/10 pain  Location: along R anterior knee incision  Description: aching  Aggravating Factors:  laying  down at night  Relieving Factors:  gentle stretching/movements    Relevant Information (PMH & Previous Tests/Imaging): x-rays  Previous Interventions/Treatments: Physical Therapy for 2 sessions at home.    Prior Level of Function (PLOF)  Patient previously independent with all ADLs  Exercise/Physical Activity: Walk at [place] 3-4 days per week up to 4 miles.   Work/School: Retired  Hobbies: Gardening, Reading     Patients Living Environment: Lives in multi-story home with bed and bath on first floor. Lives with her .     Primary Language: English    Patient's Goal(s) for Therapy: Be able to walk outside, do yard work, and [facility] for [place].     Red Flags: Do you have any of the following? No  Fever/chills, unexplained weight changes, dizziness/fainting, unexplained change in bowel or bladder functions, unexplained malaise or muscle weakness, night pain/sweats, numbness or tingling    Objective:  Objective         ROM      Hip AROM (Degrees)      (R)  (L)  Flexion: WFL  WFL        Knee AROM (Degrees)      (R)  (L)  Flexion: 124  128      Extension: Lacks 5*           Lacks 2*     Ankle AROM (Degrees)      (R)  (L)  Plantarflexion: WFL  WFL      Dorsiflexion: WFL  WFL                Strength Testing    Hip      (R)  (L)  Flexion: 4+/5  5/5     Extension: 4/5  4+/5    Abduction: 4/5  4+/5         Knee    (R)  (L)  Flexion: 4/5  5/5      Extension: 4+/5  5/5     Ankle    (R)  (L)  Plantarflexion: 4+/5  5/5      Dorsiflexion: 4+/5  5/5               Functional Screening    Posture: shoulder rounded forward and head forward    Lower Extremity Functional Movements  Transfers: Independent  Gait: Patient ambulates with flexion at the hips, increased R out toe, decreased R TKE during midstance  Assistive Device: no device    Balance  Feet Together: 30 seconds  Tandem: Unable to perform  SLS: Unable to perform    Palpation: No significant TTP; R anterior knee incision healing well, no S/S of infection    Joint  Mobility: Mild decreased R patellar joint play superior/inferior    Flexibility: Min. Restrict. R hamstring and gastroc.    Observation: Mild R quad lag        Special Tests  NT due to recent surgery      Outcome Measures:  Other Measures  Lower Extremity Funtional Score (LEFS): 24/80     EDUCATION:   Individual(s) Educated: Patient   Education Provided: Home exercise program, plan of care, activity modifications, pain management, and injury pathology  Handout(s) Provided: Scanned into chart  Home Program: See below treatment  Risk and Benefits Discussed with Patient/Caregiver/Other: Yes   Patient/Caregiver Demonstrated Understanding: Yes   Plan of Care Discussed and Agreed Upon: Yes   Patient Response to Education: Patient/Caregiver verbalized understanding of information, Patient/Caregiver performed return demonstration of exercises/activities, and Patient/Caregiver asked appropriate questions    Assessment: Patient presents to PT s/p R TKA due to OA on 9/12/2024, resulting in limited participation in pain-free ADLs and inability to perform at their prior level of function. The patient demonstrates decreased R LE strength, ROM, flexibility, balance, and gait. Skilled PT warranted to address the above stated impairments, so the patient can perform FA's without increased pain or difficulty.    PT Assessment Results: Decreased strength, Decreased range of motion, Decreased endurance, Impaired balance, Decreased mobility  Rehab Prognosis: Excellent    Clinical Presentation: Stable and/or uncomplicated characteristics    Complexity: Low    Plan:  Treatment/Interventions: Education/ Instruction, Electrical stimulation, Dry needling, Gait training, Hot pack, Manual therapy, Neuromuscular re-education, Therapeutic activities, Therapeutic exercises  PT Plan: Skilled PT  PT Frequency: 2 times per week  Duration: 5 weeks  Onset Date: 09/12/24  Certification Period Start Date: 10/01/24  Certification Period End Date:  "11/12/24  Number of Treatments Authorized: 1 of 10 ([place]- 10 used earlier this year)  Rehab Potential: Excellent  Plan of Care Agreement: Patient    Goals: Set and discussed today  Active       PT Problem       Patient will be independent with HEP.        Start:  10/01/24    Expected End:  10/15/24            Patient will demonstrate full L knee extension to improve her R TKE during gait.       Start:  10/01/24    Expected End:  11/12/24            Patient will demonstrate 5/5 strength with R knee MMT to improve her ability to perform her FA's including step negotiation and curb steps.       Start:  10/01/24    Expected End:  11/12/24            Patient will report no >/= 2/10 pain with ambulating up to 20 minutes in the community without assistive device.        Start:  10/01/24    Expected End:  11/12/24            Patient will score >/= 40/80 on LEFS to improve her ability to perform FA's without difficulty.        Start:  10/01/24    Expected End:  11/12/24                Plan of care was developed with input and agreement by the patient    Treatment Performed:  Recumb. Bike L2 x 6'  Slantboard gastroc. X 60\"  HS stretch on step 2 x 30\"    Access Code: [email or web address]  URL: [email or web address]  Date: 10/01/2024  Prepared by: Mechelle Almendarez    Exercises  - Long Sitting Quad Set with Towel Roll Under Heel  - 2 x daily - 7 x weekly - 2 sets - 10 reps  - Active Straight Leg Raise with Quad Set  - 2 x daily - 7 x weekly - 2 sets - 10 reps  - Standing Hamstring Stretch with Step  - 2 x daily - 7 x weekly - 3 sets - 1 reps - 30 second hold  - Mini Squat with Counter Support  - 2 x daily - 7 x weekly - 2 sets - 10 reps  - Standing Hip Abduction with Counter Support  - 2 x daily - 7 x weekly - 2 sets - 10 reps  - Standing Hip Extension with Counter Support  - 2 x daily - 7 x weekly - 2 sets - 10 reps  - Seated Hamstring Stretch with Chair  - 2 x daily - 7 x weekly - 1 sets - 1 reps - 2 minute " hold    Ambulatory Screenings Summary       Screening  Frequency  Date Last Completed   Spiritual and Cultural Beliefs   Screening  each visit or episode of care 9/12/2024   Falls Risk Screening  every ambulatory visit [unfilled]   Pain Screening  annually at primary care visit     Domestic Violence screening  annually at primary care visit 10/1/2024   Elder Abuse Screening  annually at primary care visit 10/1/2024   Depression Screening  annually in the primary care setting 5/28/2024   Suicide Risk Screening  annually in the primary care setting 9/12/2024   Nutrition and Food Insecurity   Screening  at least annually at primary care visit  10/1/2024   Key Learner  annually in the primary care setting 9/12/2024   Drug Screen  9/25/2024  7:56 AM   Alcohol Screen  9/25/2024  7:56 AM   Advance Directive         Mechelle Almendarez, PT

## 2024-10-03 ENCOUNTER — TREATMENT (OUTPATIENT)
Dept: PHYSICAL THERAPY | Facility: HOSPITAL | Age: 79
End: 2024-10-03
Payer: MEDICARE

## 2024-10-03 DIAGNOSIS — M25.561 ACUTE PAIN OF RIGHT KNEE: ICD-10-CM

## 2024-10-03 PROCEDURE — 97110 THERAPEUTIC EXERCISES: CPT | Mod: GP,CQ

## 2024-10-03 ASSESSMENT — PAIN - FUNCTIONAL ASSESSMENT: PAIN_FUNCTIONAL_ASSESSMENT: 0-10

## 2024-10-03 ASSESSMENT — PAIN DESCRIPTION - DESCRIPTORS: DESCRIPTORS: ACHING;THROBBING

## 2024-10-03 ASSESSMENT — PAIN SCALES - GENERAL: PAINLEVEL_OUTOF10: 0 - NO PAIN

## 2024-10-03 NOTE — PROGRESS NOTES
"  Physical Therapy Treatment    Patient Name: Domenica Estrada  MRN: [id number]  Encounter Date: 10/3/2024  Time Calculation  Start Time: 1347  Stop Time: 1430  Time Calculation (min): 43 min        PT Therapeutic Procedures Time Entry  Therapeutic Exercise Time Entry: 42        Current Problem  Problem List Items Addressed This Visit             ICD-10-CM    Acute pain of right knee M25.561     1. Acute pain of right knee  Follow Up In Physical Therapy          General  Reason for Referral: R knee pain  Referred By: Dr. Wise  Past Medical History Relevant to Rehab: L TKA 5/09/2024  Preferred Learning Style: verbal, visual, written    Subjective   Current Condition:   Better  Patient reports feeling good today, just reports ache and tightness b knees.  Reports most difficulty with sleeping and getting knee fully straight.     Performing HEP?: Yes    Precautions  Precautions  LE Weight Bearing Status: Weight Bearing as Tolerated  Medical Precautions: No known precautions/limitation  Post-Surgical Precautions: Left total knee precautions  Precautions Comment: R TKA protocol- 09/12/2024 DOS  Pain  Pain Assessment: 0-10  0-10 (Numeric) Pain Score: 0 - No pain  Pain Descriptors: Aching, Throbbing (at night)    Objective       Treatments:    Therapeutic Exercise  Therapeutic Exercise Performed: Yes  Therapeutic Exercise Activity 1: Bike Level 3 Seat 7 x 6'  Therapeutic Exercise Activity 2: 3x30 sec hamstring stretching-long sit with heel prop to aid in TKE  Therapeutic Exercise Activity 3: 2x15 Supine QS with heel prop with self push on quad  Therapeutic Exercise Activity 4: 2x15 GTB hamstring curls  Therapeutic Exercise Activity 5: 2x15 TKE blck TB  Therapeutic Exercise Activity 6: 2x15 4\" fwd step ups B LE  Therapeutic Exercise Activity 7: Gastroc stretch 2 x 30\"  Therapeutic Exercise Activity 8: 1x20 Standing hamstring curls #2      EDUCATION:   Individual(s) Educated: Patient  Education Provided: Cues for better QS " with towel under knee for resistance as well as better hamstring positioning to improve TKE.   Handout(s) Provided: Scanned into chart  Home Program: Added heel prop to all quad sets and HS stretches to improve TKE  Risk and Benefits Discussed with Patient/Caregiver/Other: Yes   Patient/Caregiver Demonstrated Understanding: Yes   Patient Response to Education: Patient/Caregiver verbalized understanding of information, Patient/Caregiver performed return demonstration of exercises/activities, and Patient/Caregiver asked appropriate questions    Assessment: Patient able to improve R knee flexion to 126 degrees in supine this date with AROM and knee extension to 4 with active QS and post stretching.  Required verbal and visual cues x 3 for proper TKE control in standing as well as reduced hip and trunk flexion compensations.  Patient cont to demonstrate reduced full knee rom and limited strength control during more complex tasks like stairs and balance challenges with cont skilled PT warranted to return to functional tasks without compensations or risk for falls.      PT Assessment  PT Assessment Results: Decreased strength, Decreased range of motion, Decreased endurance, Impaired balance, Decreased mobility  Rehab Prognosis: Excellent    Plan: Continue with POC. Continue with core stability, LE strengthening, ROM, flexibility, and balance, so the patient can perform FA's without increased pain or difficulty.  Cont to focus more on extension ROM.    OP PT Plan  Treatment/Interventions: Education/ Instruction, Electrical stimulation, Dry needling, Gait training, Hot pack, Manual therapy, Neuromuscular re-education, Therapeutic activities, Therapeutic exercises  PT Plan: Skilled PT  PT Frequency: 2 times per week  Duration: 5 weeks  Onset Date: 09/12/24  Certification Period Start Date: 10/01/24  Certification Period End Date: 11/12/24  Number of Treatments Authorized: 2 of 10 ([place]- 10 used earlier this year)  Rehab  Potential: Excellent  Plan of Care Agreement: Patient  Payor: MEDICARE / Plan: MEDICARE PART A AND B / Product Type: *No Product type* /     Visit count this episode: 2 visits    Goals:  Active       PT Problem       Patient will be independent with HEP.        Start:  10/01/24    Expected End:  10/15/24            Patient will demonstrate full L knee extension to improve her R TKE during gait.       Start:  10/01/24    Expected End:  11/12/24            Patient will demonstrate 5/5 strength with R knee MMT to improve her ability to perform her FA's including step negotiation and curb steps.       Start:  10/01/24    Expected End:  11/12/24            Patient will report no >/= 2/10 pain with ambulating up to 20 minutes in the community without assistive device.        Start:  10/01/24    Expected End:  11/12/24            Patient will score >/= 40/80 on LEFS to improve her ability to perform FA's without difficulty.        Start:  10/01/24    Expected End:  11/12/24                 Alanna Borrero, PTA

## 2024-10-07 ENCOUNTER — TREATMENT (OUTPATIENT)
Dept: PHYSICAL THERAPY | Facility: HOSPITAL | Age: 79
End: 2024-10-07
Payer: MEDICARE

## 2024-10-07 DIAGNOSIS — M25.561 ACUTE PAIN OF RIGHT KNEE: ICD-10-CM

## 2024-10-07 PROCEDURE — 97110 THERAPEUTIC EXERCISES: CPT | Mod: GP,CQ

## 2024-10-07 ASSESSMENT — PAIN - FUNCTIONAL ASSESSMENT: PAIN_FUNCTIONAL_ASSESSMENT: 0-10

## 2024-10-07 ASSESSMENT — PAIN SCALES - GENERAL: PAINLEVEL_OUTOF10: 0 - NO PAIN

## 2024-10-07 NOTE — PROGRESS NOTES
"  Physical Therapy Treatment    Patient Name: Domenica Estrada  MRN: [id number]  Encounter Date: 10/7/2024  Time Calculation  Start Time: 1301  Stop Time: 1344  Time Calculation (min): 43 min        PT Therapeutic Procedures Time Entry  Manual Therapy Time Entry: 3  Therapeutic Exercise Time Entry: 40       Current Problem  Problem List Items Addressed This Visit             ICD-10-CM    Acute pain of right knee M25.561     1. Acute pain of right knee  Follow Up In Physical Therapy          General  Reason for Referral: R knee pain  Referred By: Dr. Wise  Past Medical History Relevant to Rehab: L TKA 5/09/2024  Preferred Learning Style: verbal, visual, written  General Comment: Feeling good just noticing my balnace is off now.  no pain through either    Subjective   Current Condition:   Better  Patient reports feeling more off balance lately and cont to report difficulty up and down steps as well as limited gait distances and SLS control.     Performing HEP?: Yes    Precautions  Precautions  LE Weight Bearing Status: Weight Bearing as Tolerated  Medical Precautions: No known precautions/limitation  Post-Surgical Precautions: Left total knee precautions  Precautions Comment: R TKA protocol- 09/12/2024 DOS  Pain  Pain Assessment: 0-10  0-10 (Numeric) Pain Score: 0 - No pain  Pain Type: Acute pain, Surgical pain    Objective     Treatments:    Therapeutic Exercise  Therapeutic Exercise Performed: Yes  Therapeutic Exercise Activity 1: Bike Level 3 Seat 7 x 6'  Therapeutic Exercise Activity 2: 3x30 sec hamstring stretching-long sit with heel prop to aid in TKE  Therapeutic Exercise Activity 3: 2x15 Supine QS with heel prop with self push on quad  Therapeutic Exercise Activity 4: 2x15 Standing hamstring curls #3  Therapeutic Exercise Activity 5: 2x15 TKE blck TB  Therapeutic Exercise Activity 6: 2x15 4\" fwd step ups B LE  Therapeutic Exercise Activity 7: 2x10 SL dead lift BUE assist      Manual Therapy- Patellar mobs to R " knee in all directions to reduce tightness and aid in neutral glides.       EDUCATION:   Individual(s) Educated: Patient  Education Provided: Cont with HEP   Handout(s) Provided: Scanned into chart  Home Program: Same as previous   Risk and Benefits Discussed with Patient/Caregiver/Other: Yes   Patient/Caregiver Demonstrated Understanding: Yes   Patient Response to Education: Patient/Caregiver verbalized understanding of information, Patient/Caregiver performed return demonstration of exercises/activities, and Patient/Caregiver asked appropriate questions    Assessment: Pt cont to demonstrate tightness and quad weakness throughout QS and during closed chain TKE even with verbal and visual cues.  Education about increased compliance with hamstring stretching and QS to aid in full terminal knee ext through b le to aid in gait and balance tasks.  Pt cont to be limited in R knee ext as evidence by 4 degrees in long sit with QS. Pt cont to need skilled PT to improve gait mechanics, balance strategies and improved tolerance up and down steps.     PT Assessment  PT Assessment Results: Decreased strength, Decreased range of motion, Decreased endurance, Impaired balance, Decreased mobility  Rehab Prognosis: Excellent    Plan: Continue with POC. Continue with core stability, LE strengthening, ROM, flexibility, and balance, so the patient can perform FA's without increased pain or difficulty.    OP PT Plan  Treatment/Interventions: Education/ Instruction, Electrical stimulation, Dry needling, Gait training, Hot pack, Manual therapy, Neuromuscular re-education, Therapeutic activities, Therapeutic exercises  PT Plan: Skilled PT  PT Frequency: 2 times per week  Duration: 5 weeks  Onset Date: 09/12/24  Certification Period Start Date: 10/01/24  Certification Period End Date: 11/12/24  Number of Treatments Authorized: 3 of 10 ([place]- 10 used earlier this year)  Rehab Potential: Excellent  Plan of Care Agreement: Patient  Payor:  MEDICARE / Plan: MEDICARE PART A AND B / Product Type: *No Product type* /     Visit count this episode: 3 visits    Goals:  Active       PT Problem       Patient will be independent with HEP.        Start:  10/01/24    Expected End:  10/15/24            Patient will demonstrate full L knee extension to improve her R TKE during gait.       Start:  10/01/24    Expected End:  11/12/24            Patient will demonstrate 5/5 strength with R knee MMT to improve her ability to perform her FA's including step negotiation and curb steps.       Start:  10/01/24    Expected End:  11/12/24            Patient will report no >/= 2/10 pain with ambulating up to 20 minutes in the community without assistive device.        Start:  10/01/24    Expected End:  11/12/24            Patient will score >/= 40/80 on LEFS to improve her ability to perform FA's without difficulty.        Start:  10/01/24    Expected End:  11/12/24                 Alanna Borrero, PTA

## 2024-10-11 ENCOUNTER — TREATMENT (OUTPATIENT)
Dept: PHYSICAL THERAPY | Facility: HOSPITAL | Age: 79
End: 2024-10-11
Payer: MEDICARE

## 2024-10-11 DIAGNOSIS — M25.561 ACUTE PAIN OF RIGHT KNEE: ICD-10-CM

## 2024-10-11 PROCEDURE — 97110 THERAPEUTIC EXERCISES: CPT | Mod: GP,CQ

## 2024-10-11 PROCEDURE — 97112 NEUROMUSCULAR REEDUCATION: CPT | Mod: GP,CQ

## 2024-10-11 ASSESSMENT — PAIN SCALES - GENERAL: PAINLEVEL_OUTOF10: 0 - NO PAIN

## 2024-10-11 ASSESSMENT — PAIN - FUNCTIONAL ASSESSMENT: PAIN_FUNCTIONAL_ASSESSMENT: 0-10

## 2024-10-11 NOTE — PROGRESS NOTES
"  Physical Therapy Treatment    Patient Name: Domenica Estrdaa  MRN: [id number]  Today's Date: 10/11/2024  Time Calculation  Start Time: 1246  Stop Time: 1326  Time Calculation (min): 40 min        PT Therapeutic Procedures Time Entry  Manual Therapy Time Entry: 4  Neuromuscular Re-Education Time Entry: 8  Therapeutic Exercise Time Entry: 28                Current Problem  1. Acute pain of right knee  Follow Up In Physical Therapy          General  Reason for Referral: R knee pain  Referred By: Dr. Wise  Past Medical History Relevant to Rehab: L TKA 5/09/2024  Preferred Learning Style: verbal, visual, written  General Comment: Feeling good just noticing my balnace is off now.  no pain through either    Subjective   Current Condition:   Better  Patient reports no adverse response to last PT session. States she continues to have R knee pain at p.m. while trying to sleep.     Performing HEP?: Yes    Precautions  Precautions  LE Weight Bearing Status: Weight Bearing as Tolerated  Medical Precautions: No known precautions/limitation  Post-Surgical Precautions: Left total knee precautions  Precautions Comment: R TKA protocol- 09/12/2024 DOS  Pain  Pain Assessment: 0-10  0-10 (Numeric) Pain Score: 0 - No pain (3-4/10 at worst)  Pain Location: Knee  Pain Orientation: Right    Objective      Knee PROM: R Flexion 126     Treatments:    Therapeutic Exercise  Therapeutic Exercise Performed: Yes  Therapeutic Exercise Activity 1: Bike Level 3 Seat 7 x 6'  Therapeutic Exercise Activity 2: STS 22\" x20  Therapeutic Exercise Activity 3: Hamstring curls Blue x20  Therapeutic Exercise Activity 4: QS with heel prop x10 5\"  Therapeutic Exercise Activity 5: 2x15 TKE Black TB  Therapeutic Exercise Activity 6: 2x15 4\" fwd step ups with opposite knee drive B LE  Therapeutic Exercise Activity 7: Lateral Step ups 4\" x20 R  Therapeutic Exercise Activity 8: Standing Hip ABD Yellow x20 R/L  Therapeutic Exercise Activity 9: Standing Hip Extension " "Yellow x20 R/L    Balance/Neuromuscular Re-Education  Balance/Neuromuscular Re-Education Activity Performed: Yes  Balance/Neuromuscular Re-Education Activity 1: Airex 1/2 Tandem 30\" R/L  Balance/Neuromuscular Re-Education Activity 2: Airex chops 2# x15 R/L    Manual Therapy  Manual Therapy Performed: Yes  Manual Therapy Activity 1: R knee extension mobs to increase mobility  Manual Therapy Activity 2: R Patellar mobs sup/inf, med/lat to increase mobility                   EDUCATION:   Individual(s) Educated: Patient  Education Provided: HEP  Risk and Benefits Discussed with Patient/Caregiver/Other: Yes   Patient/Caregiver Demonstrated Understanding: Yes   Patient Response to Education: Patient/Caregiver verbalized understanding of information    Assessment: Pt required cuing to avoid compensatory movement of the R hip with TKE exercise, with good follow through demonstrated. Pt able to tolerate joint mobilization to the R knee without c/o pain.   PT Assessment  PT Assessment Results: Decreased strength, Decreased range of motion, Decreased endurance, Impaired balance, Decreased mobility  Rehab Prognosis: Excellent    Plan: Continue to progress current POC as tolerated to facilitate ability to perform functional activities.   OP PT Plan  Treatment/Interventions: Education/ Instruction, Electrical stimulation, Dry needling, Gait training, Hot pack, Manual therapy, Neuromuscular re-education, Therapeutic activities, Therapeutic exercises  PT Plan: Skilled PT  PT Frequency: 2 times per week  Duration: 5 weeks  Onset Date: 09/12/24  Certification Period Start Date: 10/01/24  Certification Period End Date: 11/12/24  Number of Treatments Authorized: 4 of 10 ([place]- 10 used earlier this year)  Rehab Potential: Excellent  Plan of Care Agreement: Patient    Goals:  Active       PT Problem       Patient will be independent with HEP.        Start:  10/01/24    Expected End:  10/15/24            Patient will demonstrate full L knee " extension to improve her R TKE during gait.       Start:  10/01/24    Expected End:  11/12/24            Patient will demonstrate 5/5 strength with R knee MMT to improve her ability to perform her FA's including step negotiation and curb steps.       Start:  10/01/24    Expected End:  11/12/24            Patient will report no >/= 2/10 pain with ambulating up to 20 minutes in the community without assistive device.        Start:  10/01/24    Expected End:  11/12/24            Patient will score >/= 40/80 on LEFS to improve her ability to perform FA's without difficulty.        Start:  10/01/24    Expected End:  11/12/24                 Valente Harden, PTA

## 2024-10-15 ENCOUNTER — APPOINTMENT (OUTPATIENT)
Dept: PHYSICAL THERAPY | Facility: HOSPITAL | Age: 79
End: 2024-10-15
Payer: MEDICARE

## 2024-10-15 DIAGNOSIS — M25.561 ACUTE PAIN OF RIGHT KNEE: ICD-10-CM

## 2024-10-15 PROCEDURE — 97140 MANUAL THERAPY 1/> REGIONS: CPT | Mod: GP | Performed by: PHYSICAL THERAPIST

## 2024-10-15 PROCEDURE — 97110 THERAPEUTIC EXERCISES: CPT | Mod: GP | Performed by: PHYSICAL THERAPIST

## 2024-10-15 PROCEDURE — 97112 NEUROMUSCULAR REEDUCATION: CPT | Mod: GP | Performed by: PHYSICAL THERAPIST

## 2024-10-15 ASSESSMENT — PAIN - FUNCTIONAL ASSESSMENT: PAIN_FUNCTIONAL_ASSESSMENT: 0-10

## 2024-10-15 ASSESSMENT — PAIN SCALES - GENERAL: PAINLEVEL_OUTOF10: 0 - NO PAIN

## 2024-10-15 NOTE — PROGRESS NOTES
"  Physical Therapy Treatment    Patient Name: Domenica Estrada  MRN: [id number]  Encounter Date: 10/15/2024  Time Calculation  Start Time: 1516  Stop Time: 1557  Time Calculation (min): 41 min        PT Therapeutic Procedures Time Entry  Manual Therapy Time Entry: 9  Neuromuscular Re-Education Time Entry: 9  Therapeutic Exercise Time Entry: 23         Current Problem  Problem List Items Addressed This Visit             ICD-10-CM    Acute pain of right knee M25.561     1. Acute pain of right knee  Follow Up In Physical Therapy          General  Reason for Referral: R knee pain  Referred By: Dr. Wise  Past Medical History Relevant to Rehab: L TKA 5/09/2024  Preferred Learning Style: verbal, visual, written  General Comment: Feeling good just noticing my balnace is off now.  no pain through either    Subjective   Current Condition:   Better  Patient reports that she continues to have no knee pain.     Performing HEP?: Yes    Precautions  Precautions  LE Weight Bearing Status: Weight Bearing as Tolerated  Medical Precautions: No known precautions/limitation  Post-Surgical Precautions: Left total knee precautions  Precautions Comment: R TKA protocol- 09/12/2024 DOS  Pain  Pain Assessment: 0-10  0-10 (Numeric) Pain Score: 0 - No pain  Pain Location: Knee  Pain Orientation: Right    Objective     R knee Extension AROM:  Lacks 3 degrees     Treatments:    Therapeutic Exercise  Therapeutic Exercise Performed: Yes  Therapeutic Exercise Activity 1: Bike Level 3 Seat 7 x 6'  Therapeutic Exercise Activity 2: Incline gastroc stretch 2 x 30\"  Therapeutic Exercise Activity 3: Self R knee extension mobs on step x 60\"  Therapeutic Exercise Activity 4: QS with heel prop 2 x10 5\"  Therapeutic Exercise Activity 5: 2x15 TKE Black TB  Therapeutic Exercise Activity 6: 2x15 4\" fwd step ups with opposite knee drive B LE  Therapeutic Exercise Activity 7: Lateral Step ups 4\" x20 R  Therapeutic Exercise Activity 8: Standing Hip ABD Yellow x20 " "R/L  Therapeutic Exercise Activity 9: Standing Hip Extension Yellow x 20 R/L    Balance/Neuromuscular Re-Education  Balance/Neuromuscular Re-Education Activity Performed: Yes  Balance/Neuromuscular Re-Education Activity 1: Airex 1/2 Tandem 2 x 30\" R/L  Balance/Neuromuscular Re-Education Activity 2: Airex chops 2# x15 R/L    Manual Therapy  Manual Therapy Performed: Yes  Manual Therapy Activity 1: R knee extension mobs to increase mobility  Manual Therapy Activity 2: R Patellar mobs sup/inf, med/lat to increase mobility  Manual Therapy Activity 3: R knee incision scar tissue mobs      EDUCATION:   Individual(s) Educated: Patient  Education Provided: Tissue healing times  Handout(s) Provided: Scanned into chart  Home Program: Gave yellow loop for hip abd and ext for HEP  Risk and Benefits Discussed with Patient/Caregiver/Other: Yes   Patient/Caregiver Demonstrated Understanding: Yes   Patient Response to Education: Patient/Caregiver verbalized understanding of information, Patient/Caregiver performed return demonstration of exercises/activities, and Patient/Caregiver asked appropriate questions    Assessment:   The patient has full R knee flexion but continues to lack full R knee extension. She ambulates with decreased R TKE during midstance. She is pain free with all exercises. The patient's incision is healing well and reviewed scar tissue massage.   PT Assessment  PT Assessment Results: Decreased strength, Decreased range of motion, Decreased endurance, Impaired balance, Decreased mobility  Rehab Prognosis: Excellent    Plan: Continue with POC.   Continue with core stability, LE strengthening, ROM, flexibility, and balance, so the patient can perform FA's without increased pain or difficulty.     OP PT Plan  Treatment/Interventions: Education/ Instruction, Electrical stimulation, Dry needling, Gait training, Hot pack, Manual therapy, Neuromuscular re-education, Therapeutic activities, Therapeutic exercises  PT " Plan: Skilled PT  PT Frequency: 2 times per week  Duration: 5 weeks  Onset Date: 09/12/24  Certification Period Start Date: 10/01/24  Certification Period End Date: 11/12/24  Number of Treatments Authorized: 5 of 10 ([place]- 10 used earlier this year)  Rehab Potential: Excellent  Plan of Care Agreement: Patient  Payor: MEDICARE / Plan: MEDICARE PART A AND B / Product Type: *No Product type* /     Visit count this episode: 5 visits    Goals:  Active       PT Problem       Patient will be independent with HEP.        Start:  10/01/24    Expected End:  10/15/24            Patient will demonstrate full L knee extension to improve her R TKE during gait.       Start:  10/01/24    Expected End:  11/12/24            Patient will demonstrate 5/5 strength with R knee MMT to improve her ability to perform her FA's including step negotiation and curb steps.       Start:  10/01/24    Expected End:  11/12/24            Patient will report no >/= 2/10 pain with ambulating up to 20 minutes in the community without assistive device.        Start:  10/01/24    Expected End:  11/12/24            Patient will score >/= 40/80 on LEFS to improve her ability to perform FA's without difficulty.        Start:  10/01/24    Expected End:  11/12/24                 Mechelle Almendarez, PT

## 2024-10-17 ENCOUNTER — TREATMENT (OUTPATIENT)
Dept: PHYSICAL THERAPY | Facility: HOSPITAL | Age: 79
End: 2024-10-17
Payer: MEDICARE

## 2024-10-17 DIAGNOSIS — M25.561 ACUTE PAIN OF RIGHT KNEE: ICD-10-CM

## 2024-10-17 PROCEDURE — 97110 THERAPEUTIC EXERCISES: CPT | Mod: GP,CQ

## 2024-10-17 PROCEDURE — 97140 MANUAL THERAPY 1/> REGIONS: CPT | Mod: GP,CQ

## 2024-10-17 ASSESSMENT — PAIN - FUNCTIONAL ASSESSMENT: PAIN_FUNCTIONAL_ASSESSMENT: 0-10

## 2024-10-17 ASSESSMENT — PAIN SCALES - GENERAL: PAINLEVEL_OUTOF10: 0 - NO PAIN

## 2024-10-17 NOTE — PROGRESS NOTES
"  Physical Therapy Treatment    Patient Name: Domenica Estrada  MRN: [id number]  Today's Date: 10/17/2024  Time Calculation  Start Time: 1258  Stop Time: 1340  Time Calculation (min): 42 min        PT Therapeutic Procedures Time Entry  Manual Therapy Time Entry: 8  Therapeutic Exercise Time Entry: 34                Current Problem  1. Acute pain of right knee  Follow Up In Physical Therapy          General  Reason for Referral: R knee pain  Referred By: Dr. Wise  Past Medical History Relevant to Rehab: L TKA 5/09/2024  Preferred Learning Style: verbal, visual, written  General Comment: Feeling good just noticing my balnace is off now.  no pain through either    Subjective   Current Condition:   Better  Patient reports no adverse response to last PT session.    Performing HEP?: Yes    Precautions  Precautions  LE Weight Bearing Status: Weight Bearing as Tolerated  Medical Precautions: No known precautions/limitation  Post-Surgical Precautions: Left total knee precautions  Precautions Comment: R TKA protocol- 09/12/2024 DOS  Pain  Pain Assessment: 0-10  0-10 (Numeric) Pain Score: 0 - No pain    Objective         Treatments:    Therapeutic Exercise  Therapeutic Exercise Performed: Yes  Therapeutic Exercise Activity 1: Bike Level 3 Seat 7 x 5'  Therapeutic Exercise Activity 2: Incline gastroc stretch 2 x 30\"  Therapeutic Exercise Activity 3: Self R knee extension mobs on step x 60\"  Therapeutic Exercise Activity 4: QS with heel prop 2 x10 5\"  Therapeutic Exercise Activity 5: TKE Black x20 5\" hold  Therapeutic Exercise Activity 6: HR off 4\" step x20  Therapeutic Exercise Activity 7: Lateral Step ups with opposite knee drive 4\" x20 R/L  Therapeutic Exercise Activity 8: STS with OH Press 2# MB x20  Therapeutic Exercise Activity 10: Bridge with Hip ABD Blue  Therapeutic Exercise Activity 11: Hamstring Curls 35# x20  Therapeutic Exercise Activity 12: Split stance Rows Double Green x20 R/L  Therapeutic Exercise Activity 13: " "Pallof Press Double Green x10 5\" hold    Balance/Neuromuscular Re-Education  Balance/Neuromuscular Re-Education Activity Performed: No    Manual Therapy  Manual Therapy Performed: Yes  Manual Therapy Activity 1: R knee extension mobs to increase mobility  Manual Therapy Activity 2: R Patellar mobs sup/inf, med/lat to increase mobility  Manual Therapy Activity 3: R knee incision scar tissue mobs                   EDUCATION:   Individual(s) Educated: Patient  Education Provided: HEP  Risk and Benefits Discussed with Patient/Caregiver/Other: Yes   Patient/Caregiver Demonstrated Understanding: Yes   Patient Response to Education: Patient/Caregiver verbalized understanding of information    Assessment: Pt lacks full TKE with step up exercise d/t limitations with ROM. Pt able to perform progression of TE program with good technique and no c/o R knee pain. No change with pain level at the end of PT treatment.   PT Assessment  PT Assessment Results: Decreased strength, Decreased range of motion, Decreased endurance, Impaired balance, Decreased mobility  Rehab Prognosis: Excellent    Plan: Continue to progress current POC as tolerated to facilitate ability to perform functional activities.   OP PT Plan  Treatment/Interventions: Education/ Instruction, Electrical stimulation, Dry needling, Gait training, Hot pack, Manual therapy, Neuromuscular re-education, Therapeutic activities, Therapeutic exercises  PT Plan: Skilled PT  PT Frequency: 2 times per week  Duration: 5 weeks  Onset Date: 09/12/24  Certification Period Start Date: 10/01/24  Certification Period End Date: 11/12/24  Number of Treatments Authorized: 6 of 10 ([place]- 10 used earlier this year)  Rehab Potential: Excellent  Plan of Care Agreement: Patient    Goals:  Active       PT Problem       Patient will be independent with HEP.        Start:  10/01/24    Expected End:  10/15/24            Patient will demonstrate full L knee extension to improve her R TKE during " gait.       Start:  10/01/24    Expected End:  11/12/24            Patient will demonstrate 5/5 strength with R knee MMT to improve her ability to perform her FA's including step negotiation and curb steps.       Start:  10/01/24    Expected End:  11/12/24            Patient will report no >/= 2/10 pain with ambulating up to 20 minutes in the community without assistive device.        Start:  10/01/24    Expected End:  11/12/24            Patient will score >/= 40/80 on LEFS to improve her ability to perform FA's without difficulty.        Start:  10/01/24    Expected End:  11/12/24                 Valente Harden, PTA

## 2024-10-21 ENCOUNTER — TREATMENT (OUTPATIENT)
Dept: PHYSICAL THERAPY | Facility: HOSPITAL | Age: 79
End: 2024-10-21
Payer: MEDICARE

## 2024-10-21 DIAGNOSIS — M25.561 ACUTE PAIN OF RIGHT KNEE: ICD-10-CM

## 2024-10-21 PROCEDURE — 97110 THERAPEUTIC EXERCISES: CPT | Mod: GP,CQ

## 2024-10-21 PROCEDURE — 97140 MANUAL THERAPY 1/> REGIONS: CPT | Mod: GP,CQ

## 2024-10-21 ASSESSMENT — PAIN - FUNCTIONAL ASSESSMENT: PAIN_FUNCTIONAL_ASSESSMENT: 0-10

## 2024-10-21 ASSESSMENT — PAIN SCALES - GENERAL: PAINLEVEL_OUTOF10: 0 - NO PAIN

## 2024-10-21 NOTE — PROGRESS NOTES
"  Physical Therapy Treatment    Patient Name: Domenica Estrada  MRN: [id number]  Today's Date: 10/21/2024  Time Calculation  Start Time: 1258  Stop Time: 1340  Time Calculation (min): 42 min        PT Therapeutic Procedures Time Entry  Manual Therapy Time Entry: 8  Neuromuscular Re-Education Time Entry: 4  Therapeutic Exercise Time Entry: 30                Current Problem  1. Acute pain of right knee  Follow Up In Physical Therapy          General  Reason for Referral: R knee pain  Referred By: Dr. Wise  Past Medical History Relevant to Rehab: L TKA 5/09/2024  Preferred Learning Style: verbal, visual, written    Subjective   Current Condition:   Better  Patient reports she notices feeling unsteady when she is walking in her yard, feels she is more unsteady since having the two surgeries.     Performing HEP?: Yes    Precautions  Precautions  LE Weight Bearing Status: Weight Bearing as Tolerated  Medical Precautions: No known precautions/limitation  Post-Surgical Precautions: Left total knee precautions  Precautions Comment: R TKA protocol- 09/12/2024 DOS  Pain  Pain Assessment: 0-10  0-10 (Numeric) Pain Score: 0 - No pain    Objective : R Quadriceps flexibility 102         Treatments:    Therapeutic Exercise  Therapeutic Exercise Performed: Yes  Therapeutic Exercise Activity 1: Bike Level 4 x 5'  Therapeutic Exercise Activity 2: Incline gastroc stretch 2 x 30\"  Therapeutic Exercise Activity 3: Self R knee extension mobs on step x 60\"  Therapeutic Exercise Activity 4: Prone quad strectch x2 30\"  Therapeutic Exercise Activity 5: TKE Black x20 5\" hold  Therapeutic Exercise Activity 6: HR off 4\" step x20  Therapeutic Exercise Activity 7: Step ups with opposite knee drive 6\" x20 R/L  Therapeutic Exercise Activity 8: STS with OH Press 2# MB x20  Therapeutic Exercise Activity 9: Standing Hip Extension Yellow x 20 R/L  Therapeutic Exercise Activity 10: Bridge with Hip ADD x20 5\"  Therapeutic Exercise Activity 11: Hamstring " "Curls 35# x20  Therapeutic Exercise Activity 12: side step Yellow 8' x4  Therapeutic Exercise Activity 13: LAQ 3# x20 R/L    Balance/Neuromuscular Re-Education  Balance/Neuromuscular Re-Education Activity Performed: Yes  Balance/Neuromuscular Re-Education Activity 1: Airex 3/4 tandem 30\" R/L  Balance/Neuromuscular Re-Education Activity 2: tandem walk 6' x3    Manual Therapy  Manual Therapy Performed: Yes  Manual Therapy Activity 1: R knee extension mobs to increase mobility  Manual Therapy Activity 2: R Patellar mobs sup/inf, med/lat to increase mobility                   EDUCATION:   Individual(s) Educated: Patient  Education Provided: quadriceps stretch  Risk and Benefits Discussed with Patient/Caregiver/Other: Yes   Patient/Caregiver Demonstrated Understanding: Yes   Patient Response to Education: Patient/Caregiver verbalized understanding of information and Patient/Caregiver performed return demonstration of exercises/activities    Assessment: Pt required cuing to avoid sliding the L foot during side stepping, with good follow through demonstrated. Noted decreased flexibility with R quadriceps, so initiated prone stretches to decrease tightness. Difficulty noted with 3/4 tandem on compliant surface, however no LOB. No change with pain level at the end of PT session.   PT Assessment  PT Assessment Results: Decreased strength, Decreased range of motion, Decreased endurance, Impaired balance, Decreased mobility  Rehab Prognosis: Excellent    Plan: Continue to progress current POC as tolerated to facilitate ability to perform functional activities.   OP PT Plan  PT Plan: Skilled PT  PT Frequency: 2 times per week  Duration: 5 weeks  Onset Date: 09/12/24  Certification Period Start Date: 10/01/24  Certification Period End Date: 11/12/24  Number of Treatments Authorized: 7 of 10 ([place]- 10 used earlier this year)    Goals:  Active       PT Problem       Patient will be independent with HEP.        Start:  10/01/24    " Expected End:  10/15/24            Patient will demonstrate full L knee extension to improve her R TKE during gait.       Start:  10/01/24    Expected End:  11/12/24            Patient will demonstrate 5/5 strength with R knee MMT to improve her ability to perform her FA's including step negotiation and curb steps.       Start:  10/01/24    Expected End:  11/12/24            Patient will report no >/= 2/10 pain with ambulating up to 20 minutes in the community without assistive device.        Start:  10/01/24    Expected End:  11/12/24            Patient will score >/= 40/80 on LEFS to improve her ability to perform FA's without difficulty.        Start:  10/01/24    Expected End:  11/12/24                 Valente Harden, PTA

## 2024-10-24 ENCOUNTER — TREATMENT (OUTPATIENT)
Dept: PHYSICAL THERAPY | Facility: HOSPITAL | Age: 79
End: 2024-10-24
Payer: MEDICARE

## 2024-10-24 DIAGNOSIS — M25.561 ACUTE PAIN OF RIGHT KNEE: ICD-10-CM

## 2024-10-24 PROCEDURE — 97140 MANUAL THERAPY 1/> REGIONS: CPT | Mod: GP | Performed by: PHYSICAL THERAPIST

## 2024-10-24 PROCEDURE — 97110 THERAPEUTIC EXERCISES: CPT | Mod: GP | Performed by: PHYSICAL THERAPIST

## 2024-10-24 ASSESSMENT — PAIN - FUNCTIONAL ASSESSMENT: PAIN_FUNCTIONAL_ASSESSMENT: 0-10

## 2024-10-24 ASSESSMENT — PAIN SCALES - GENERAL: PAINLEVEL_OUTOF10: 0 - NO PAIN

## 2024-10-24 NOTE — PROGRESS NOTES
Physical Therapy Discharge and Treatment    Patient Name: Domenica Estrada  MRN: [id number]  Encounter Date: 10/24/2024  Time Calculation  Start Time: 1130  Stop Time: 1210  Time Calculation (min): 40 min        PT Therapeutic Procedures Time Entry  Manual Therapy Time Entry: 9  Therapeutic Exercise Time Entry: 31      Current Problem  Problem List Items Addressed This Visit             ICD-10-CM    Acute pain of right knee M25.561     1. Acute pain of right knee  Follow Up In Physical Therapy          General  Reason for Referral: R knee pain  Referred By: Dr. Wise  Past Medical History Relevant to Rehab: L TKA 5/09/2024  Preferred Learning Style: verbal, visual, written    Subjective   Current Condition:   Better  Patient reports that she has made excellent progress and feels ready for discharge today. She is doing her exercises regularly. She is sleeping better, ambulating in the community without an assistive device, and able to negotiate curbs and steps without difficulty.     Performing HEP?: Yes    Precautions  Precautions  LE Weight Bearing Status: Weight Bearing as Tolerated  Medical Precautions: No known precautions/limitation  Post-Surgical Precautions: Left total knee precautions  Precautions Comment: R TKA protocol- 09/12/2024 DOS  Pain  Pain Assessment: 0-10  0-10 (Numeric) Pain Score: 0 - No pain    Objective     ROM        Hip AROM (Degrees)                             (R)                    (L)  Flexion:            WFL                  WFL                         Knee AROM (Degrees)                             (R)                    (L)  Flexion:            125                   128                                             Extension:        Lacks 3*           Lacks 2*                Ankle AROM (Degrees)                             (R)                    (L)  Plantarflexion:  WFL                  WFL                                            Dorsiflexion:     WFL                  WFL              "                                                                                       Strength Testing     Hip                             (R)                    (L)  Flexion:            5/5                   5/5                                 Extension:        4+/5                 4+/5                   Abduction:       4+/5                 5/5                                    Knee                          (R)                    (L)  Flexion:            5/5                   5/5                                             Extension:       5/5                 5/5            Ankle                          (R)                    (L)  Plantarflexion:  5/5                 5/5                                             Dorsiflexion:     4+/5                 5/5                                                                                         Functional Screening     Posture: shoulder rounded forward and head forward     Lower Extremity Functional Movements  Transfers: Independent  Gait: Patient ambulates with flexion at the hips, increased R out toe, decreased R TKE during midstance  Assistive Device: no device     Balance  Feet Together: 30 seconds  Tandem: R/L 30 seconds  SLS: (R) 4 seconds (L) 6 seconds     Palpation: No significant TTP; R anterior knee incision healing well, no S/S of infection     Joint Mobility: Mild decreased R patellar joint play superior/inferior     Flexibility: Min. Restrict. R hamstring and gastroc.     Observation: No quad lag with SLR     Outcome Measures:  Other Measures  Lower Extremity Funtional Score (LEFS): 63/80    Treatments:    Therapeutic Exercise  Therapeutic Exercise Performed: Yes  Therapeutic Exercise Activity 1: Bike Level 4 Seat 7 x 5'  Therapeutic Exercise Activity 2: Incline gastroc stretch 2 x 30\"  Therapeutic Exercise Activity 3: Self R knee extension mobs on step x 60\"  Therapeutic Exercise Activity 4: TKE Black x20 5\" hold  Therapeutic Exercise Activity 5: HR " "off 4\" step x20  Therapeutic Exercise Activity 6: Step ups with opposite knee drive 6\" x20 R/L  Therapeutic Exercise Activity 7: STS with OH Press 2# MB x20  Therapeutic Exercise Activity 8: Standing Hip Extension Yellow x 20 R/L  Therapeutic Exercise Activity 9: side step Yellow 8' x4    Balance/Neuromuscular Re-Education  Balance/Neuromuscular Re-Education Activity Performed: Yes  Balance/Neuromuscular Re-Education Activity 1: Tandem see objective  Balance/Neuromuscular Re-Education Activity 2: SLS see objective    Manual Therapy  Manual Therapy Performed: Yes  Manual Therapy Activity 1: R knee extension mobs to increase mobility  Manual Therapy Activity 2: R Patellar mobs sup/inf, med/lat to increase mobility    EDUCATION:   Individual(s) Educated: Patient  Education Provided: Post discharge instructions including HEP  Handout(s) Provided: Scanned into chart  Home Program: Continue HEP  Risk and Benefits Discussed with Patient/Caregiver/Other: Yes   Patient/Caregiver Demonstrated Understanding: Yes   Patient Response to Education: Patient/Caregiver verbalized understanding of information, Patient/Caregiver performed return demonstration of exercises/activities, and Patient/Caregiver asked appropriate questions    Assessment:   The patient has met the majority of her PT goals. She is able to perform all her exercises pain free and without difficulty. She is almost at full R knee extension. PT instructed patient to continue with her HEP post discharge as well as her strengthening exercises. Patient is discharged to home program today.   PT Assessment  PT Assessment Results: Decreased strength, Decreased range of motion, Decreased endurance, Impaired balance, Decreased mobility  Rehab Prognosis: Excellent    Plan: Discharge to home program   OP PT Plan  PT Plan: No Additional PT interventions required at this time  Onset Date: 09/12/24  Certification Period Start Date: 10/01/24  Certification Period End Date: " 11/12/24  Number of Treatments Authorized: 8 of 10 ([place]- 10 used earlier this year)  Payor: MEDICARE / Plan: MEDICARE PART A AND B / Product Type: *No Product type* /     Visit count this episode: 8 visits    Goals:  Active       PT Problem       Patient will be independent with HEP.  (Met)       Start:  10/01/24    Expected End:  10/15/24    Resolved:  10/24/24         Patient will demonstrate full L knee extension to improve her R TKE during gait. (Progressing)       Start:  10/01/24    Expected End:  11/12/24            Patient will demonstrate 5/5 strength with R knee MMT to improve her ability to perform her FA's including step negotiation and curb steps. (Met)       Start:  10/01/24    Expected End:  11/12/24    Resolved:  10/24/24         Patient will report no >/= 2/10 pain with ambulating up to 20 minutes in the community without assistive device.  (Met)       Start:  10/01/24    Expected End:  11/12/24    Resolved:  10/24/24         Patient will score >/= 40/80 on LEFS to improve her ability to perform FA's without difficulty.  (Met)       Start:  10/01/24    Expected End:  11/12/24    Resolved:  10/24/24              Mechelle Almendarez, PT

## 2024-10-28 ENCOUNTER — APPOINTMENT (OUTPATIENT)
Dept: PHYSICAL THERAPY | Facility: HOSPITAL | Age: 79
End: 2024-10-28
Payer: MEDICARE

## 2024-10-31 ENCOUNTER — APPOINTMENT (OUTPATIENT)
Dept: PHYSICAL THERAPY | Facility: HOSPITAL | Age: 79
End: 2024-10-31
Payer: MEDICARE

## 2024-11-13 ENCOUNTER — HOSPITAL ENCOUNTER (OUTPATIENT)
Dept: RADIOLOGY | Facility: CLINIC | Age: 79
Discharge: HOME | End: 2024-11-13
Payer: MEDICARE

## 2024-11-13 ENCOUNTER — APPOINTMENT (OUTPATIENT)
Dept: ORTHOPEDIC SURGERY | Facility: CLINIC | Age: 79
End: 2024-11-13
Payer: MEDICARE

## 2024-11-13 DIAGNOSIS — M17.11 ARTHRITIS OF RIGHT KNEE: ICD-10-CM

## 2024-11-13 PROCEDURE — 1159F MED LIST DOCD IN RCRD: CPT

## 2024-11-13 PROCEDURE — 1160F RVW MEDS BY RX/DR IN RCRD: CPT

## 2024-11-13 PROCEDURE — 99024 POSTOP FOLLOW-UP VISIT: CPT

## 2024-11-13 PROCEDURE — 73564 X-RAY EXAM KNEE 4 OR MORE: CPT | Mod: RIGHT SIDE | Performed by: RADIOLOGY

## 2024-11-13 PROCEDURE — 1036F TOBACCO NON-USER: CPT

## 2024-11-13 PROCEDURE — 73564 X-RAY EXAM KNEE 4 OR MORE: CPT | Mod: RT

## 2024-11-13 ASSESSMENT — PAIN - FUNCTIONAL ASSESSMENT: PAIN_FUNCTIONAL_ASSESSMENT: NO/DENIES PAIN

## 2024-11-13 NOTE — PROGRESS NOTES
Chief Complaint   Patient presents with    Right Knee - Post-op     9/12/24 RT TKA           This is a 79 y.o. female who is 19 weeks out from right total knee.  No drainage from her incision no fevers or chills.  Progressing well with physical therapy and appropriately improving in function.  No other new issues or symptoms.    Right knee examination: Surgical incision well healed no erythema no drainage.  Stable to varus valgus stress range of motion 0 to 100 degrees extension flexion.  Neurovascular tact distally    X-rays of the knee were reviewed independently interpreted by me today, the show stable total knee arthroplasty no fracture dislocation or loosening    Impression plan: 79 y.o. female 9 weeks out from right total knee.  We discussed continuing physical therapy and home exercise program. Pain medications to be used as needed. Assistive devices as needed per PT. We discussed DVT prophylaxis until 6 weeks. No dentist until 3 months and thereafter dental prophylaxis for life. Activity as tolerated weightbearing as tolerated. I have personally reviewed the [place]AR[place] report for the patient. This report is scanned into the electronic medical record. I have considered the risks of abuse, dependence, addiction and diversion.  She can follow-up in 5 years with her x-rays or if any other history arises beforehand

## 2025-01-19 DIAGNOSIS — F41.9 ANXIETY: ICD-10-CM

## 2025-01-19 RX ORDER — HYDROXYZINE HYDROCHLORIDE 25 MG/1
TABLET, FILM COATED ORAL
Qty: 90 TABLET | Refills: 1 | Status: SHIPPED | OUTPATIENT
Start: 2025-01-19

## 2025-04-01 PROBLEM — M25.562 ACUTE BILATERAL KNEE PAIN: Status: RESOLVED | Noted: 2024-02-14 | Resolved: 2025-04-01

## 2025-04-01 PROBLEM — M25.561 ACUTE BILATERAL KNEE PAIN: Status: RESOLVED | Noted: 2024-02-14 | Resolved: 2025-04-01

## 2025-04-01 PROBLEM — E55.9 VITAMIN D DEFICIENCY: Status: ACTIVE | Noted: 2023-05-17

## 2025-04-01 PROBLEM — M25.561 ACUTE PAIN OF RIGHT KNEE: Status: RESOLVED | Noted: 2024-06-20 | Resolved: 2025-04-01

## 2025-04-01 PROBLEM — M85.80 OSTEOPENIA: Status: ACTIVE | Noted: 2022-11-08

## 2025-04-11 RX ORDER — ALENDRONATE SODIUM 70 MG/1
TABLET ORAL
COMMUNITY
End: 2025-04-24 | Stop reason: WASHOUT

## 2025-04-11 RX ORDER — ATORVASTATIN CALCIUM 40 MG/1
1 TABLET, FILM COATED ORAL DAILY
COMMUNITY
End: 2025-04-24 | Stop reason: WASHOUT

## 2025-04-24 ENCOUNTER — OFFICE VISIT (OUTPATIENT)
Dept: PRIMARY CARE | Facility: CLINIC | Age: 80
End: 2025-04-24
Payer: MEDICARE

## 2025-04-24 VITALS
SYSTOLIC BLOOD PRESSURE: 132 MMHG | DIASTOLIC BLOOD PRESSURE: 82 MMHG | HEIGHT: 61 IN | BODY MASS INDEX: 25.19 KG/M2 | OXYGEN SATURATION: 98 % | WEIGHT: 133.4 LBS | HEART RATE: 70 BPM

## 2025-04-24 DIAGNOSIS — Z13.1 SCREENING FOR DIABETES MELLITUS: ICD-10-CM

## 2025-04-24 DIAGNOSIS — Z78.0 ASYMPTOMATIC MENOPAUSAL STATE: ICD-10-CM

## 2025-04-24 DIAGNOSIS — Z13.6 SCREENING FOR CARDIOVASCULAR CONDITION: ICD-10-CM

## 2025-04-24 DIAGNOSIS — Z11.59 NEED FOR HEPATITIS C SCREENING TEST: ICD-10-CM

## 2025-04-24 DIAGNOSIS — Z00.00 ROUTINE GENERAL MEDICAL EXAMINATION AT HEALTH CARE FACILITY: Primary | ICD-10-CM

## 2025-04-24 PROCEDURE — 99215 OFFICE O/P EST HI 40 MIN: CPT | Performed by: FAMILY MEDICINE

## 2025-04-24 PROCEDURE — 1160F RVW MEDS BY RX/DR IN RCRD: CPT | Performed by: FAMILY MEDICINE

## 2025-04-24 PROCEDURE — 1126F AMNT PAIN NOTED NONE PRSNT: CPT | Performed by: FAMILY MEDICINE

## 2025-04-24 PROCEDURE — 1124F ACP DISCUSS-NO DSCNMKR DOCD: CPT | Performed by: FAMILY MEDICINE

## 2025-04-24 PROCEDURE — 1170F FXNL STATUS ASSESSED: CPT | Performed by: FAMILY MEDICINE

## 2025-04-24 PROCEDURE — 1159F MED LIST DOCD IN RCRD: CPT | Performed by: FAMILY MEDICINE

## 2025-04-24 PROCEDURE — G0439 PPPS, SUBSEQ VISIT: HCPCS | Performed by: FAMILY MEDICINE

## 2025-04-24 PROCEDURE — 1036F TOBACCO NON-USER: CPT | Performed by: FAMILY MEDICINE

## 2025-04-24 ASSESSMENT — ACTIVITIES OF DAILY LIVING (ADL)
DOING_HOUSEWORK: INDEPENDENT
BATHING: INDEPENDENT
GROCERY_SHOPPING: INDEPENDENT
DRESSING: INDEPENDENT
MANAGING_FINANCES: INDEPENDENT
TAKING_MEDICATION: INDEPENDENT

## 2025-04-24 ASSESSMENT — PATIENT HEALTH QUESTIONNAIRE - PHQ9
1. LITTLE INTEREST OR PLEASURE IN DOING THINGS: NOT AT ALL
SUM OF ALL RESPONSES TO PHQ9 QUESTIONS 1 AND 2: 0
2. FEELING DOWN, DEPRESSED OR HOPELESS: NOT AT ALL

## 2025-04-24 ASSESSMENT — PAIN SCALES - GENERAL: PAINLEVEL_OUTOF10: 0-NO PAIN

## 2025-04-24 NOTE — PROGRESS NOTES
"Subjective   Reason for Visit: Domenica Estrada is an 79 y.o. female here for a Medicare Wellness visit.     Past Medical, Surgical, and Family History reviewed and updated in chart.  Past Medical History:   Diagnosis Date    Anxiety     Arthritis     GERD (gastroesophageal reflux disease)     HLD (hyperlipidemia)     Osteoarthritis     Osteopenia        Reviewed all medications by prescribing practitioner or clinical pharmacist (such as prescriptions, OTCs, herbal therapies and supplements) and documented in the medical record.  Current Outpatient Medications   Medication Sig Dispense Refill    hydrOXYzine HCL (Atarax) 25 mg tablet 1 TABLET AS NEEDED DAILY FOR ANXIETY 90 tablet 1     No current facility-administered medications for this visit.       HPI here for subsequent medicare physical    Patient Care Team:  Jo Mota MD as PCP - General (Family Medicine)     Review of Systems  n/a    Objective   Vitals:  /82   Pulse 70   Ht 1.549 m (5' 1\")   Wt 60.5 kg (133 lb 6.4 oz)   LMP  (LMP Unknown)   SpO2 98%   BMI 25.21 kg/m²       Physical Exam  Vitals reviewed.   Constitutional:       Appearance: Normal appearance.   HENT:      Head: Normocephalic and atraumatic.      Right Ear: Tympanic membrane, ear canal and external ear normal.      Left Ear: Tympanic membrane, ear canal and external ear normal.      Mouth/Throat:      Mouth: Mucous membranes are moist.      Pharynx: Oropharynx is clear.   Eyes:      Extraocular Movements: Extraocular movements intact.      Pupils: Pupils are equal, round, and reactive to light.   Cardiovascular:      Rate and Rhythm: Normal rate and regular rhythm.      Heart sounds: Normal heart sounds.   Pulmonary:      Effort: Pulmonary effort is normal.      Breath sounds: Normal breath sounds.   Abdominal:      Tenderness: There is no right CVA tenderness or left CVA tenderness.   Lymphadenopathy:      Cervical: No cervical adenopathy.   Skin:     General: Skin is warm. "   Neurological:      General: No focal deficit present.      Mental Status: She is alert and oriented to person, place, and time.      Motor: Motor function is intact.      Coordination: Coordination is intact.      Deep Tendon Reflexes: Reflexes are normal and symmetric.      Comments: Slight head tremor   Psychiatric:         Mood and Affect: Mood normal.         Behavior: Behavior normal.         Assessment & Plan  Routine general medical examination at health care facility    Orders:    1 Year Follow Up In Primary Care - Wellness Exam; Future    Screening for diabetes mellitus    Orders:    Glucose; Future    Screening for cardiovascular condition  Pt stopped her statin a year ago; may need to resume depending on results  Orders:    Lipid Panel; Future    Need for hepatitis C screening test    Orders:    Hepatitis C Antibody; Future    Asymptomatic menopausal state  Pt stopped her fosamax a year ago that she was taking for osteopenia.  Orders:    XR DEXA bone density; Future

## 2025-04-25 ENCOUNTER — PATIENT MESSAGE (OUTPATIENT)
Dept: PRIMARY CARE | Facility: CLINIC | Age: 80
End: 2025-04-25
Payer: MEDICARE

## 2025-04-25 DIAGNOSIS — E78.2 MIXED HYPERLIPIDEMIA: Primary | ICD-10-CM

## 2025-04-25 LAB
CHOLEST SERPL-MCNC: 264 MG/DL
CHOLEST/HDLC SERPL: 3.5 (CALC)
GLUCOSE SERPL-MCNC: 91 MG/DL (ref 65–99)
HCV AB SERPL QL IA: NORMAL
HDLC SERPL-MCNC: 76 MG/DL
LDLC SERPL CALC-MCNC: 166 MG/DL (CALC)
NONHDLC SERPL-MCNC: 188 MG/DL (CALC)
TRIGL SERPL-MCNC: 105 MG/DL

## 2025-04-25 RX ORDER — ATORVASTATIN CALCIUM 40 MG/1
40 TABLET, FILM COATED ORAL DAILY
Qty: 90 TABLET | Refills: 0 | Status: SHIPPED | OUTPATIENT
Start: 2025-04-25 | End: 2025-07-24

## 2025-04-25 RX ORDER — ATORVASTATIN CALCIUM 40 MG/1
40 TABLET, FILM COATED ORAL DAILY
COMMUNITY
End: 2025-04-25 | Stop reason: SDUPTHER

## 2025-05-13 ENCOUNTER — TELEPHONE (OUTPATIENT)
Dept: PRIMARY CARE | Facility: CLINIC | Age: 80
End: 2025-05-13
Payer: MEDICARE

## 2025-05-13 ENCOUNTER — HOSPITAL ENCOUNTER (OUTPATIENT)
Dept: RADIOLOGY | Facility: HOSPITAL | Age: 80
Discharge: HOME | End: 2025-05-13
Payer: MEDICARE

## 2025-05-13 DIAGNOSIS — Z78.0 ASYMPTOMATIC MENOPAUSAL STATE: ICD-10-CM

## 2025-05-13 DIAGNOSIS — M85.80 OSTEOPENIA, UNSPECIFIED LOCATION: Primary | ICD-10-CM

## 2025-05-13 PROCEDURE — 77080 DXA BONE DENSITY AXIAL: CPT

## 2025-05-13 PROCEDURE — 77080 DXA BONE DENSITY AXIAL: CPT | Performed by: RADIOLOGY

## 2025-05-13 RX ORDER — ALENDRONATE SODIUM 70 MG/1
70 TABLET ORAL
Qty: 12 TABLET | Refills: 3 | Status: SHIPPED | OUTPATIENT
Start: 2025-05-13 | End: 2026-05-13

## 2025-07-15 DIAGNOSIS — F41.9 ANXIETY: ICD-10-CM

## 2025-07-15 RX ORDER — HYDROXYZINE HYDROCHLORIDE 25 MG/1
TABLET, FILM COATED ORAL
Qty: 90 TABLET | Refills: 1 | Status: SHIPPED | OUTPATIENT
Start: 2025-07-15

## 2025-07-17 LAB
CHOLEST SERPL-MCNC: 179 MG/DL
CHOLEST/HDLC SERPL: 2.6 (CALC)
HDLC SERPL-MCNC: 69 MG/DL
LDLC SERPL CALC-MCNC: 87 MG/DL (CALC)
NONHDLC SERPL-MCNC: 110 MG/DL (CALC)
TRIGL SERPL-MCNC: 134 MG/DL

## 2025-07-18 DIAGNOSIS — E78.2 MIXED HYPERLIPIDEMIA: ICD-10-CM

## 2025-07-24 DIAGNOSIS — E78.2 MIXED HYPERLIPIDEMIA: ICD-10-CM

## 2025-07-24 RX ORDER — ATORVASTATIN CALCIUM 40 MG/1
40 TABLET, FILM COATED ORAL DAILY
Qty: 90 TABLET | Refills: 3 | Status: SHIPPED | OUTPATIENT
Start: 2025-07-24

## (undated) DEVICE — SLEEVE, VASO PRESS, CALF GARMENT, MEDIUM, GREEN

## (undated) DEVICE — BOWL, MIXING, ADVANCED, CEMENT

## (undated) DEVICE — SUTURE, VICRYL, 1, 24 IN, CTD, UNDYED

## (undated) DEVICE — SUTURE, VICRYL, 1, 36 IN, CT-1, UNDYED

## (undated) DEVICE — SUTURE, CTD, VICRYL, 2-0, UND, BR, CT-2

## (undated) DEVICE — SYRINGE, 60 CC, LUER LOCK, MONOJECT

## (undated) DEVICE — DRESSING, GAUZE, SPONGE, KERLIX, SUPER, 6 X 6.75 IN, STERILE 10PK

## (undated) DEVICE — CLOSURE SYSTEM, DERMABOND, PRINEO, 22CM, STERILE

## (undated) DEVICE — PAD, KNEE PATIENT, DEMAYO, DISP, STERILE

## (undated) DEVICE — GLOVE, SURGICAL, PROTEXIS PI ORTHO, 8.0, PF, LF

## (undated) DEVICE — GLOVE, SURGICAL, PROTEXIS PI BLUE W/NEUTHERA, 8.0, PF, LF

## (undated) DEVICE — TUBING, IRRIGATION, HIGH FLOW, HAND PIECE SET

## (undated) DEVICE — NEEDLE, SPINAL, QUINCKE, 18 G X 3.5 IN, PINK HUB

## (undated) DEVICE — BLADE, RECIPROCATING, LARGE, 12.7MM

## (undated) DEVICE — DRAPE, SHEET, 17 X 23 IN

## (undated) DEVICE — APPLICATOR, CHLORAPREP, W/ORANGE TINT, 26ML

## (undated) DEVICE — HOOD, STERISHIELD T4 SYSTEM

## (undated) DEVICE — BLADE, SAW, SAGITTAL, 18.0 X 1.27 X 90MM

## (undated) DEVICE — Device

## (undated) DEVICE — SKIN CLOSURE SYS, PREMIERPRO EXOFIN, 1-4CM X 22CM, 1.75G TUBE

## (undated) DEVICE — SOLUTION, IRRIGATION, USP, SODIUM CHLORIDE 0.9%, 3000 ML, BAG

## (undated) DEVICE — SYRINGE, 20 CC, LUER LOCK

## (undated) DEVICE — IRRIGATION SYSTEM, WOUND, SURGIPHOR, 450ML, STERILE

## (undated) DEVICE — BANDAGE, ELASTIC,  6 IN X 11 YDS, STERILE, LF

## (undated) DEVICE — CONTAINER, SPECIMEN, 4 OZ, OR PEEL PACK, STERILE

## (undated) DEVICE — SUTURE, V-LOC, 3-0, 18IN, P-12

## (undated) DEVICE — DRESSING, MEPILEX BORDER, POST-OP AG, 4 X 10 IN

## (undated) DEVICE — SPONGE, LAP, XRAY DECT, 18IN X 18IN, W/MASTER DMT, STERILE